# Patient Record
Sex: FEMALE | Race: BLACK OR AFRICAN AMERICAN | NOT HISPANIC OR LATINO | Employment: UNEMPLOYED | URBAN - METROPOLITAN AREA
[De-identification: names, ages, dates, MRNs, and addresses within clinical notes are randomized per-mention and may not be internally consistent; named-entity substitution may affect disease eponyms.]

---

## 2023-01-19 ENCOUNTER — HOSPITAL ENCOUNTER (OUTPATIENT)
Dept: DATA CONVERSION | Facility: HOSPITAL | Age: 23
End: 2023-01-19
Attending: OBSTETRICS & GYNECOLOGY

## 2023-01-19 DIAGNOSIS — O99.891 OTHER SPECIFIED DISEASES AND CONDITIONS COMPLICATING PREGNANCY (HHS-HCC): ICD-10-CM

## 2023-01-19 DIAGNOSIS — O47.1 FALSE LABOR AT OR AFTER 37 COMPLETED WEEKS OF GESTATION (HHS-HCC): ICD-10-CM

## 2023-01-19 DIAGNOSIS — Z3A.38 38 WEEKS GESTATION OF PREGNANCY (HHS-HCC): ICD-10-CM

## 2023-01-19 DIAGNOSIS — N89.8 OTHER SPECIFIED NONINFLAMMATORY DISORDERS OF VAGINA: ICD-10-CM

## 2023-01-19 DIAGNOSIS — O99.343 OTHER MENTAL DISORDERS COMPLICATING PREGNANCY, THIRD TRIMESTER (HHS-HCC): ICD-10-CM

## 2023-01-19 DIAGNOSIS — Z34.80 ENCOUNTER FOR SUPERVISION OF OTHER NORMAL PREGNANCY, UNSPECIFIED TRIMESTER (HHS-HCC): ICD-10-CM

## 2023-01-19 DIAGNOSIS — O99.820 STREPTOCOCCUS B CARRIER STATE COMPLICATING PREGNANCY (HHS-HCC): ICD-10-CM

## 2023-01-19 DIAGNOSIS — F32.A DEPRESSION, UNSPECIFIED: ICD-10-CM

## 2023-03-02 NOTE — PROGRESS NOTES
Current Stage:   Stage: Triage     OB Dating:   EDC/EGA:  ·  Final JOSSY 27-Jan-2023   ·  EGA 38.6     Subjective Data:   Antepartum:  Vaginal Bleeding: No   Contractions/Abdominal Pain: Yes   Discharge/Loss of Fluid: No   Fetal Movement: Good   Fevers/Chills: No   Preeclampsia Symptoms: No   Antepartum:    Sarah is a 21yo G1 at 38.6wks b/o LMP and c/w 14.6wk U/S who presents to triage for LOF.  She reports her membranes being stripped in the office today and since has  had continued leakage of blood tinged fluid.  She loss of fluid around 6pm and continued leakage since.  She reports contractions that have gotten closer together and are now about 5min apart.  She denies VB and endorses good fetal movement.    Of note, pt was in the office today and had a HA with increased swelling.  PEC labs were drawn.  Results reviewed with patient and were negative.  Pt asymptomatic now and with normotensive BPs.    Pregnancy notable for:  - Depression, follows w/ guidestone  - GBS+      Objective Information:    Objective Information:      T   P  R  BP   MAP  SpO2   Value  36.7  85  18  123/82   98  99%  Date/Time 1/19 20:30 1/19 20:40 1/19 20:30 1/19 20:30  1/19 20:30 1/19 20:40  Range  (36.7C - 36.7C )  (79 - 89 )  (18 - 18 )  (121 - 123 )/ (74 - 82 )  (98 - 98 )  (98% - 99% )      Pain reported at 1/19 20:30: 0 = None      Physical Exam:   Constitutional: Alert, conversational, well-appearing   Obstetric: Cervical Exam: 2/70/-2, consistent with  exam in office    FHT: 135, mod variability, +accels, -decels   [place]: palpated 2-5min apart, moderate    SSE:  - Neg pooling, nitrazine, ferning  - No active bleeding present  - No blood present in vault  - Normal vaginal discharge present   Eyes: Sclera white, EOM intact, no discharge or erythema   ENMT: No hearing deficit, no goiter present   Head/Neck: Normocephalic, atraumatic, full range  of motion intact   Respiratory/Thorax: No increased work of breathing,  no cough present,  rate normal, clear to auscultation   Cardiovascular: No edema present, RRR   Gastrointestinal: Gravid, abd soft and nontender  between ctx   Genitourinary: No suprapubic tenderness   Musculoskeletal: Strength +5 in all extremities bilaterally   Extremities: No calf tenderness, redness or warmth   Neurological: A/O x3, conversational   Breast: No redness or warmth   Psychological: Behavior appropriate, interactive   Skin: No rashes or lesions      Testing:   NST Interpretation - Baby A:  ·  Baseline    ·  Variability moderate (amplitude range 6 to 25 bpm)   ·  Interpretation Reactive (2 15x15 accels)   ·  Accelerations Present   ·  Decelerations Absent     Assessment and Plan:        Additional Dx:   38 weeks gestation of pregnancy: Entered Date: 2023  21:01   Non-stress test reactive: Entered Date: 2023 21:01   False labor: Entered Date: 2023 21:01    Assessment:    Sarah is a 23yo G1 at 38.6wks b/o LMP and c/w 14.6wk U/S who presents to triage for LOF and ctx    False labor   - Cervix: 2/70/-2, stable from office exam  - TOCO: ctx q2-5min, palpate as moderate  - SSE: neg x3  - S/sx of active labor reviewed  - Recommended tylenol, warm showers, hot packs for discomfort  - Via shared decision making, offered pt 2hr recheck vs returning when ctx worsen, pt opted to go home and return for signs of active labor    IUP at 38.6wks  - NST reactive  - Good fetal movement  - Continue routine prenatal care  - GBS+, for PCN in labor  - Precautions to return discussed     Maternal Well-being  - Vital signs stable and WNL  - All questions and concerns addressed    Plan and tracing discussed with Dr. Sharma who reviewed tracing and agrees with d/c home.     Leah Merino, MSN, APRN, FNP-C          Attestation:   Note Completion:  I am a:  Advanced Practice Provider   Attending Only - Shared Visit with Advanced Practice Provider This is a shared visit.  I have reviewed the Advanced Practice  Provider?s encounter note, approve the Advanced Practice Provider?s documentation,  and provide the following additional information from my personal encounter.    Comments/ Additional Findings    Pt seen and agree with above assessment and plan.  Pt slightly uncomfortable with contractions and offered a 2 hour re-check but patient declines.   Pt counseled on when to return and patient and partner expressed understanding. NST and vitals reviewed.           Electronic Signatures:  Linda Sharma)  (Signed 2023 00:32)   Authored: Note Completion   Co-Signer: Current Stage, OB Dating, Subjective Data, Objective Data,  Testing, Assessment and Plan, Note Completion  Leah Merino (APRN-CNP)  (Signed 2023 21:06)   Authored: Current Stage, OB Dating, Subjective Data,  Objective Data,  Testing, Assessment and Plan, Note Completion      Last Updated: 2023 00:32 by Linda Sharma)

## 2023-09-07 VITALS
HEART RATE: 89 BPM | WEIGHT: 138.89 LBS | TEMPERATURE: 98.1 F | SYSTOLIC BLOOD PRESSURE: 121 MMHG | BODY MASS INDEX: 28 KG/M2 | OXYGEN SATURATION: 98 % | DIASTOLIC BLOOD PRESSURE: 74 MMHG | RESPIRATION RATE: 18 BRPM | HEIGHT: 59 IN

## 2023-09-14 NOTE — PROGRESS NOTES
Current Stage:   Stage: Triage     OB Dating:   EDC/EGA:  ·  Final JOSSY 27-Jan-2023   ·  EGA 38.6     Subjective Data:   Antepartum:  Vaginal Bleeding: No   Contractions/Abdominal Pain: Yes   Discharge/Loss of Fluid: No   Fetal Movement: Good   Fevers/Chills: No   Preeclampsia Symptoms: No   Antepartum:    Sarah is a 21yo G1 at 38.6wks b/o LMP and c/w 14.6wk U/S who presents to triage for LOF.  She reports her membranes being stripped in the office today and since has  had continued leakage of blood tinged fluid.  She loss of fluid around 6pm and continued leakage since.  She reports contractions that have gotten closer together and are now about 5min apart.  She denies VB and endorses good fetal movement.    Of note, pt was in the office today and had a HA with increased swelling.  PEC labs were drawn.  Results reviewed with patient and were negative.  Pt asymptomatic now and with normotensive BPs.    Pregnancy notable for:  - Depression, follows w/ guidestone  - GBS+      Objective Information:    Objective Information:      T   P  R  BP   MAP  SpO2   Value  36.7  85  18  123/82   98  99%  Date/Time 1/19 20:30 1/19 20:40 1/19 20:30 1/19 20:30  1/19 20:30 1/19 20:40  Range  (36.7C - 36.7C )  (79 - 89 )  (18 - 18 )  (121 - 123 )/ (74 - 82 )  (98 - 98 )  (98% - 99% )      Pain reported at 1/19 20:30: 0 = None      Physical Exam:   Constitutional: Alert, conversational, well-appearing   Obstetric: Cervical Exam: 2/70/-2, consistent with  exam in office    FHT: 135, mod variability, +accels, -decels   [place]: palpated 2-5min apart, moderate    SSE:  - Neg pooling, nitrazine, ferning  - No active bleeding present  - No blood present in vault  - Normal vaginal discharge present   Eyes: Sclera white, EOM intact, no discharge or erythema   ENMT: No hearing deficit, no goiter present   Head/Neck: Normocephalic, atraumatic, full range  of motion intact   Respiratory/Thorax: No increased work of breathing,  no cough present,  rate normal, clear to auscultation   Cardiovascular: No edema present, RRR   Gastrointestinal: Gravid, abd soft and nontender  between ctx   Genitourinary: No suprapubic tenderness   Musculoskeletal: Strength +5 in all extremities bilaterally   Extremities: No calf tenderness, redness or warmth   Neurological: A/O x3, conversational   Breast: No redness or warmth   Psychological: Behavior appropriate, interactive   Skin: No rashes or lesions      Testing:   NST Interpretation - Baby A:  ·  Baseline    ·  Variability moderate (amplitude range 6 to 25 bpm)   ·  Interpretation Reactive (2 15x15 accels)   ·  Accelerations Present   ·  Decelerations Absent     Assessment and Plan:        Additional Dx:   38 weeks gestation of pregnancy: Entered Date: 2023  21:01   Non-stress test reactive: Entered Date: 2023 21:01   False labor: Entered Date: 2023 21:01    Assessment:    Sarah is a 23yo G1 at 38.6wks b/o LMP and c/w 14.6wk U/S who presents to triage for LOF and ctx    False labor   - Cervix: 2/70/-2, stable from office exam  - TOCO: ctx q2-5min, palpate as moderate  - SSE: neg x3  - S/sx of active labor reviewed  - Recommended tylenol, warm showers, hot packs for discomfort  - Via shared decision making, offered pt 2hr recheck vs returning when ctx worsen, pt opted to go home and return for signs of active labor    IUP at 38.6wks  - NST reactive  - Good fetal movement  - Continue routine prenatal care  - GBS+, for PCN in labor  - Precautions to return discussed     Maternal Well-being  - Vital signs stable and WNL  - All questions and concerns addressed    Plan and tracing discussed with Dr. Sharma who reviewed tracing and agrees with d/c home.     Leah Merino, MSN, APRN, FNP-C          Attestation:   Note Completion:  I am a:  Advanced Practice Provider   Attending Only - Shared Visit with Advanced Practice Provider This is a shared visit.  I have reviewed the Advanced Practice  Provider?s encounter note, approve the Advanced Practice Provider?s documentation,  and provide the following additional information from my personal encounter.    Comments/ Additional Findings    Pt seen and agree with above assessment and plan.  Pt slightly uncomfortable with contractions and offered a 2 hour re-check but patient declines.   Pt counseled on when to return and patient and partner expressed understanding. NST and vitals reviewed.           Electronic Signatures:  Linda Sharma)  (Signed 2023 00:32)   Authored: Note Completion   Co-Signer: Current Stage, OB Dating, Subjective Data, Objective Data,  Testing, Assessment and Plan, Note Completion  Leah Merino (APRN-CNP)  (Signed 2023 21:06)   Authored: Current Stage, OB Dating, Subjective Data,  Objective Data,  Testing, Assessment and Plan, Note Completion      Last Updated: 2023 00:32 by Linda Sharma)

## 2023-10-09 PROBLEM — R51.9 ACUTE NONINTRACTABLE HEADACHE: Status: ACTIVE | Noted: 2023-10-09

## 2023-10-09 PROBLEM — F32.A DEPRESSION AFFECTING PREGNANCY (HHS-HCC): Status: ACTIVE | Noted: 2023-10-09

## 2023-10-09 PROBLEM — O99.340 DEPRESSION AFFECTING PREGNANCY (HHS-HCC): Status: ACTIVE | Noted: 2023-10-09

## 2023-10-09 RX ORDER — ERGOCALCIFEROL (VITAMIN D2) 10 MCG
1 TABLET ORAL DAILY
COMMUNITY
Start: 2022-06-29

## 2023-10-09 RX ORDER — CLINDAMYCIN PHOSPHATE 10 UG/ML
LOTION TOPICAL
COMMUNITY
Start: 2020-10-23

## 2023-10-09 RX ORDER — FLUTICASONE PROPIONATE 50 MCG
SPRAY, SUSPENSION (ML) NASAL
COMMUNITY
Start: 2022-04-01

## 2023-10-10 ENCOUNTER — APPOINTMENT (OUTPATIENT)
Dept: DERMATOLOGY | Facility: CLINIC | Age: 23
End: 2023-10-10
Payer: COMMERCIAL

## 2023-11-27 ENCOUNTER — HOSPITAL ENCOUNTER (EMERGENCY)
Facility: HOSPITAL | Age: 23
Discharge: HOME | End: 2023-11-27
Payer: COMMERCIAL

## 2023-11-27 VITALS
SYSTOLIC BLOOD PRESSURE: 115 MMHG | RESPIRATION RATE: 18 BRPM | WEIGHT: 123 LBS | HEIGHT: 59 IN | HEART RATE: 76 BPM | OXYGEN SATURATION: 99 % | TEMPERATURE: 99 F | BODY MASS INDEX: 24.8 KG/M2 | DIASTOLIC BLOOD PRESSURE: 83 MMHG

## 2023-11-27 DIAGNOSIS — R11.0 NAUSEA: Primary | ICD-10-CM

## 2023-11-27 DIAGNOSIS — Z34.90 PREGNANCY, UNSPECIFIED GESTATIONAL AGE (HHS-HCC): ICD-10-CM

## 2023-11-27 LAB
APPEARANCE UR: ABNORMAL
BILIRUB UR STRIP.AUTO-MCNC: NEGATIVE MG/DL
COLOR UR: YELLOW
GLUCOSE UR STRIP.AUTO-MCNC: NEGATIVE MG/DL
HCG UR QL IA.RAPID: POSITIVE
KETONES UR STRIP.AUTO-MCNC: ABNORMAL MG/DL
LEUKOCYTE ESTERASE UR QL STRIP.AUTO: NEGATIVE
MUCOUS THREADS #/AREA URNS AUTO: NORMAL /LPF
NITRITE UR QL STRIP.AUTO: NEGATIVE
PH UR STRIP.AUTO: 6 [PH]
PREGNANCY TEST URINE, POC: POSITIVE
PROT UR STRIP.AUTO-MCNC: ABNORMAL MG/DL
RBC # UR STRIP.AUTO: NEGATIVE /UL
RBC #/AREA URNS AUTO: NORMAL /HPF
SP GR UR STRIP.AUTO: 1.03
SQUAMOUS #/AREA URNS AUTO: NORMAL /HPF
UROBILINOGEN UR STRIP.AUTO-MCNC: 2 MG/DL
WBC #/AREA URNS AUTO: NORMAL /HPF

## 2023-11-27 PROCEDURE — 81025 URINE PREGNANCY TEST: CPT | Performed by: EMERGENCY MEDICINE

## 2023-11-27 PROCEDURE — 99284 EMERGENCY DEPT VISIT MOD MDM: CPT

## 2023-11-27 PROCEDURE — 81001 URINALYSIS AUTO W/SCOPE: CPT | Performed by: PHYSICIAN ASSISTANT

## 2023-11-27 PROCEDURE — 99283 EMERGENCY DEPT VISIT LOW MDM: CPT

## 2023-11-27 ASSESSMENT — COLUMBIA-SUICIDE SEVERITY RATING SCALE - C-SSRS
6. HAVE YOU EVER DONE ANYTHING, STARTED TO DO ANYTHING, OR PREPARED TO DO ANYTHING TO END YOUR LIFE?: NO
1. IN THE PAST MONTH, HAVE YOU WISHED YOU WERE DEAD OR WISHED YOU COULD GO TO SLEEP AND NOT WAKE UP?: NO
2. HAVE YOU ACTUALLY HAD ANY THOUGHTS OF KILLING YOURSELF?: NO

## 2023-11-27 NOTE — ED PROVIDER NOTES
"Limitations to History: None  External Records Reviewed  Independent Historians: None  Social determinants affecting care: None    HPI  Sarah Reynolds is a 23 y.o. female who presents emergency department for assessment of nausea.  She reports that for the last few days she has been having nausea and does not really want to eat because this makes her nausea worse.  She reports that she is moving her bowels normally and urinating appropriately.  She is about 12 days late on her menstrual cycle however this can be normal for her.  She has not had abdominal pains or actual vomiting.  She denies any chest pains or shortness of breath.  She has not had any cough, nasal congestion, rhinorrhea, or sore throat.  Has not had fever or chills.  She has not been around sick contacts.  She has no further complaints.    PMH  No past medical history on file. reviewed by myself.    Meds  Current Outpatient Medications   Medication Instructions    clindamycin (Cleocin T) 1 % lotion Clindamycin Phosphate 1 % External Lotion   Quantity: 60  Refills: 0        Start : 23-Oct-2020   Active    fluticasone (Flonase) 50 mcg/actuation nasal spray Fluticasone Propionate 50 MCG/ACT Nasal Suspension   Quantity: 16  Refills: 0        Start : 1-Apr-2022   Active    prenatal vit no.129-iron-folic (Prenatal One Daily) 27 mg iron- 800 mcg tablet 1 tablet, oral, Daily    prenatal vitamin, iron-folic, (prenatal vit no.130-iron-folic) 27 mg iron-800 mcg folic acid tablet 1 tablet, oral, Daily       Allergies  No Known Allergies reviewed by myself.    SHx    reviewed by myself.      ------------------------------------------------------------------------------------------------------------------------------------------    /83   Pulse 76   Temp 37.2 °C (99 °F) (Temporal)   Resp 18   Ht 1.499 m (4' 11\")   Wt 55.8 kg (123 lb)   LMP 10/15/2023 (Approximate)   SpO2 99%   BMI 24.84 kg/m²     Physical Exam  Vitals and nursing note reviewed. "   Constitutional:       General: She is not in acute distress.     Appearance: Normal appearance. She is normal weight. She is not ill-appearing or toxic-appearing.   HENT:      Head: Normocephalic.      Nose: Nose normal.      Mouth/Throat:      Mouth: Mucous membranes are moist.      Pharynx: Oropharynx is clear.   Eyes:      Extraocular Movements: Extraocular movements intact.      Conjunctiva/sclera: Conjunctivae normal.   Cardiovascular:      Rate and Rhythm: Normal rate and regular rhythm.   Pulmonary:      Effort: Pulmonary effort is normal.      Breath sounds: Normal breath sounds.   Abdominal:      General: Abdomen is flat. Bowel sounds are normal.      Palpations: Abdomen is soft.      Tenderness: There is no abdominal tenderness. There is no guarding or rebound.   Musculoskeletal:         General: Normal range of motion.      Cervical back: Neck supple.   Skin:     General: Skin is warm and dry.   Neurological:      General: No focal deficit present.      Mental Status: She is alert and oriented to person, place, and time.   Psychiatric:         Attention and Perception: Attention normal.         Mood and Affect: Mood normal.          ------------------------------------------------------------------------------------------------------------------------------------------  Imaging  No orders to display        Labs  Labs Reviewed   HCG, URINE, QUALITATIVE - Abnormal       Result Value    HCG, Urine POSITIVE (*)    URINALYSIS WITH REFLEX MICROSCOPIC AND CULTURE - Abnormal    Color, Urine Yellow      Appearance, Urine Hazy (*)     Specific Gravity, Urine 1.028      pH, Urine 6.0      Protein, Urine 30 (1+) (*)     Glucose, Urine NEGATIVE      Blood, Urine NEGATIVE      Ketones, Urine 20 (1+) (*)     Bilirubin, Urine NEGATIVE      Urobilinogen, Urine 2.0 (*)     Nitrite, Urine NEGATIVE      Leukocyte Esterase, Urine NEGATIVE     POCT PREGNANCY, URINE - Abnormal    Preg Test, Ur Positive (*)    URINALYSIS WITH  REFLEX MICROSCOPIC AND CULTURE    Narrative:     The following orders were created for panel order Urinalysis with Reflex Microscopic and Culture.  Procedure                               Abnormality         Status                     ---------                               -----------         ------                     Urinalysis with Reflex M...[[id number]]  Abnormal            Final result               Extra Urine Gray Tube[[id number]]                            In process                   Please view results for these tests on the individual orders.   EXTRA URINE GRAY TUBE   URINALYSIS MICROSCOPIC WITH REFLEX CULTURE    WBC, Urine NONE      RBC, Urine 1-2      Squamous Epithelial Cells, Urine 1-9 (SPARSE)      Mucus, Urine 1+          ED Course  Diagnoses as of 11/27/23 1644   Nausea   Pregnancy, unspecified gestational age        Medical Decision Making: She did not appear ill or toxic.  Vital signs reviewed and stable.  Her abdomen is soft and nontender.  Urinalysis obtained as well as a urine pregnancy.     Differential diagnoses considered: Pregnancy, UTI, COVID, influenza, viral illness, others    Urine pregnancy came back positive.  UA negative for UTI.  I discussed this with the patient.  I recommended she only takes Tylenol for pain control.  She does have an OB/GYN and she was instructed to call tomorrow for close follow-up appointment.  She not had any vaginal bleeding and does not have any abdominal tenderness or pain so I do not believe she needs any further imaging at this time.  She was recommended to start taking a prenatal vitamin.  She is to return to the ER immediately if symptoms change or worsen.  She verbalized understanding and agreed to plan of care.  She was discharged home in stable condition.       Diagnosis:  Nausea, pregnancy  Plan: Discharge           John Quinones PA-C  11/27/23 [id number]

## 2023-11-27 NOTE — DISCHARGE INSTRUCTIONS
Follow with your OB/GYN within 1 week due to the pregnancy.  If you have pain, you can take Tylenol.  Start taking prenatal vitamin.  Return to the ER immediately if your symptoms change or worsen.

## 2023-12-13 LAB — HOLD SPECIMEN: NORMAL

## 2024-01-30 ENCOUNTER — APPOINTMENT (OUTPATIENT)
Dept: OBSTETRICS AND GYNECOLOGY | Facility: CLINIC | Age: 24
End: 2024-01-30
Payer: COMMERCIAL

## 2024-06-17 ENCOUNTER — LAB (OUTPATIENT)
Dept: LAB | Facility: LAB | Age: 24
End: 2024-06-17
Payer: COMMERCIAL

## 2024-06-17 ENCOUNTER — APPOINTMENT (OUTPATIENT)
Dept: OBSTETRICS AND GYNECOLOGY | Facility: CLINIC | Age: 24
End: 2024-06-17
Payer: COMMERCIAL

## 2024-06-17 DIAGNOSIS — Z32.00 ENCOUNTER FOR CONFIRMATION OF PREGNANCY TEST RESULT WITH PHYSICAL EXAMINATION: Primary | ICD-10-CM

## 2024-06-17 DIAGNOSIS — Z32.00 ENCOUNTER FOR CONFIRMATION OF PREGNANCY TEST RESULT WITH PHYSICAL EXAMINATION: ICD-10-CM

## 2024-06-17 LAB — B-HCG SERPL-ACNC: ABNORMAL MIU/ML

## 2024-06-17 PROCEDURE — 1036F TOBACCO NON-USER: CPT | Performed by: NURSE PRACTITIONER

## 2024-06-17 PROCEDURE — 99213 OFFICE O/P EST LOW 20 MIN: CPT | Performed by: NURSE PRACTITIONER

## 2024-06-17 PROCEDURE — 36415 COLL VENOUS BLD VENIPUNCTURE: CPT

## 2024-06-17 PROCEDURE — 84702 CHORIONIC GONADOTROPIN TEST: CPT

## 2024-06-17 RX ORDER — TRETINOIN 0.25 MG/G
CREAM TOPICAL
COMMUNITY
Start: 2023-07-10

## 2024-06-17 ASSESSMENT — ENCOUNTER SYMPTOMS
COUGH: 1
LOSS OF SENSATION IN FEET: 0
OCCASIONAL FEELINGS OF UNSTEADINESS: 0
DEPRESSION: 0

## 2024-06-17 NOTE — PROGRESS NOTES
Subjective   Patient ID: Sarah Reynolds is a 23 y.o. female who presents for miscarriage follow up (Pt here d/t reported miscarriage in May/Positive pregnancy test in office/Seen in ED 5/5/24).  HPI  23-year-old G3, P1 here today as a new patient.  Patient was scheduled as a new OB visit but is here stating she had a miscarriage.    Review of history shows patient has had 1 full-term positive delivery January 20, 2022 that was an uncomplicated delivery.  Patient terminated a pregnancy in January of this year and was started on birth control pills.  Her last menstrual period was approximately April 3 and then she went to the emergency room on May 5 reporting bleeding in early pregnancy and had a beta quant of 12.  She declined an ultrasound at that time and stated she was going to follow-up with her obstetrician the following day which she never did.    She reports that she bled heavily for 1 week with blood clots and has not bled since that time.    She is here today with this previously scheduled appointment.  In office urine pregnancy test is positive.  Review of Systems   Respiratory:  Positive for cough.    Genitourinary:  Positive for vaginal bleeding.   All other systems reviewed and are negative.      Objective   Physical Exam deferred  Assessment/Plan   Problem List Items Addressed This Visit    None  Visit Diagnoses         Codes    Encounter for confirmation of pregnancy test result with physical examination    -  Primary Z32.00    Relevant Orders    Human Chorionic Gonadotropin, Serum Quantitative          Beta quant ordered and we will proceed after results are back.  Patient will need to have plan for continuing pregnancy or alternative once results are in.       Tina Valera, TAN-CNP 06/17/24 4:12 PM

## 2024-06-20 ENCOUNTER — HOSPITAL ENCOUNTER (OUTPATIENT)
Dept: RADIOLOGY | Facility: CLINIC | Age: 24
Discharge: HOME | End: 2024-06-20
Payer: COMMERCIAL

## 2024-06-20 DIAGNOSIS — Z32.00 ENCOUNTER FOR CONFIRMATION OF PREGNANCY TEST RESULT WITH PHYSICAL EXAMINATION: ICD-10-CM

## 2024-06-20 PROCEDURE — 76817 TRANSVAGINAL US OBSTETRIC: CPT

## 2024-06-20 PROCEDURE — 76801 OB US < 14 WKS SINGLE FETUS: CPT

## 2024-07-11 ENCOUNTER — ROUTINE PRENATAL (OUTPATIENT)
Dept: OBSTETRICS AND GYNECOLOGY | Facility: CLINIC | Age: 24
End: 2024-07-11
Payer: COMMERCIAL

## 2024-07-11 VITALS — BODY MASS INDEX: 23.23 KG/M2 | WEIGHT: 115 LBS | DIASTOLIC BLOOD PRESSURE: 70 MMHG | SYSTOLIC BLOOD PRESSURE: 103 MMHG

## 2024-07-11 DIAGNOSIS — Z3A.09 9 WEEKS GESTATION OF PREGNANCY (HHS-HCC): Primary | ICD-10-CM

## 2024-07-11 PROCEDURE — 87591 N.GONORRHOEAE DNA AMP PROB: CPT

## 2024-07-11 PROCEDURE — 87661 TRICHOMONAS VAGINALIS AMPLIF: CPT

## 2024-07-11 PROCEDURE — 99214 OFFICE O/P EST MOD 30 MIN: CPT | Performed by: NURSE PRACTITIONER

## 2024-07-11 PROCEDURE — 87491 CHLMYD TRACH DNA AMP PROBE: CPT

## 2024-07-11 PROCEDURE — 88175 CYTOPATH C/V AUTO FLUID REDO: CPT

## 2024-07-11 ASSESSMENT — ENCOUNTER SYMPTOMS
RESPIRATORY NEGATIVE: 0
ENDOCRINE NEGATIVE: 0
CARDIOVASCULAR NEGATIVE: 0
PSYCHIATRIC NEGATIVE: 0
CONSTITUTIONAL NEGATIVE: 0
GASTROINTESTINAL NEGATIVE: 0
ALLERGIC/IMMUNOLOGIC NEGATIVE: 0
MUSCULOSKELETAL NEGATIVE: 0
HEMATOLOGIC/LYMPHATIC NEGATIVE: 0
EYES NEGATIVE: 0
NEUROLOGICAL NEGATIVE: 0

## 2024-07-11 NOTE — PROGRESS NOTES
Subjective   Patient ID [id number]   Sarah Reynolds is a 23 y.o.  at 9w2d with a working estimated date of delivery of 2025, by Ultrasound who presents for an initial prenatal visit. This pregnancy is  accepted .    Her pregnancy is complicated by:  Recent termination of pregnancy    OB History    Para Term  AB Living   3 1 1   1 1   SAB IAB Ectopic Multiple Live Births     1     1      # Outcome Date GA Lbr Oswaldo/2nd Weight Sex Type Anes PTL Lv   3 Current            2 IAB 24           1 Term 23 39w0d  2.778 kg M Vag-Spont EPI N LUDIN          Objective   Physical Exam  Weight: 52.2 kg (115 lb)  Expected Total Weight Gain: Could not be calculated   Pregravid BMI: Could not be calculated  BP: 103/70          OBGyn Exam    Prenatal Labs  Labs were ordered    Assessment/Plan   Problem List Items Addressed This Visit    None  Visit Diagnoses         Codes    9 weeks gestation of pregnancy ([facility])    -  Primary Z3A.09    Relevant Orders    CBC Anemia Panel With Reflex,Pregnancy    Type And Screen    Hemoglobin Identification with Path Review    Syphilis Screen with Reflex    Rubella Antibody, IgG    Hepatitis C Antibody    Urine Culture    Hepatitis B Surface Antigen    HIV 1/2 Antigen/Antibody Screen with Reflex to Confirmation    TSH with reflex to Free T4 if abnormal    THINPREP PAP TEST    Hemoglobin A1C    Myriad Prequel Prenatal Screen    US MAC OB imaging order            Immunizations: Not reviewed  Prenatal Labs ordered  Daily prenatal vitamins prescribed  First trimester screening and second trimester screening discussed. Patient decided to prequel screening will be drawn once patient reaches 10 weeks.  This will include gender screening although patient has had this done at an outside laboratory already.  Follow up in 4 weeks for return OB visit.  Subjective   Patient ID [id number]   Sarah Reynolds is a 23 y.o.  at 9w2d with a working estimated date of  "delivery of 2/11/2025, by Ultrasound who presents for a routine prenatal visit. She denies vaginal bleeding, leakage of fluid, decreased fetal movements, and contractions.    Her pregnancy is complicated by:  Nothing    Objective   Physical Exam  Weight: 52.2 kg (115 lb), Pregravid BMI: Could not be calculated  Expected Total Weight Gain: Could not be calculated   BP: 103/70    Fundal Height (cm): 8 cm    Prenatal Labs  Urine dip:  Lab Results   Component Value Date    KETONESU NEGATIVE 05/05/2024     Lab Results   Component Value Date    HGB 12.5 05/05/2024    HCT 36.6 05/05/2024    ABO O 05/05/2024    HEPBSAG NONREACTIVE 06/29/2022     No results found for: \"PAPPA\", \"AFP\", \"HCG\", \"ESTRIOL\", \"INHBA\"    Imaging  The most recent ultrasound was performed on The most recent ultrasound study is not finalized with a study GA of The most recent ultrasound study is not finalized.  The most recent ultrasound study is not finalized  The most recent ultrasound study is not finalized    Assessment/Plan   Problem List Items Addressed This Visit    None  Visit Diagnoses         Codes    9 weeks gestation of pregnancy ([facility])    -  Primary Z3A.09    Relevant Orders    CBC Anemia Panel With Reflex,Pregnancy    Type And Screen    Hemoglobin Identification with Path Review    Syphilis Screen with Reflex    Rubella Antibody, IgG    Hepatitis C Antibody    Urine Culture    Hepatitis B Surface Antigen    HIV 1/2 Antigen/Antibody Screen with Reflex to Confirmation    TSH with reflex to Free T4 if abnormal    THINPREP PAP TEST    Hemoglobin A1C    Myriad Prequel Prenatal Screen    US MAC OB imaging order            Continue prenatal vitamin.  Labs reviewed.  Order placed for anatomy scan at 20 weeks.  Nuchal translucency scan 12 to 13 weeks  Follow up in 4 weeks for a routine prenatal visit.  "

## 2024-07-13 LAB
C TRACH RRNA SPEC QL NAA+PROBE: NEGATIVE
N GONORRHOEA DNA SPEC QL PROBE+SIG AMP: NEGATIVE
T VAGINALIS RRNA SPEC QL NAA+PROBE: NEGATIVE

## 2024-07-16 LAB
CYTOLOGY CMNT CVX/VAG CYTO-IMP: NORMAL
LAB AP HPV HR: NORMAL
LAB AP PAP ADDITIONAL TESTS: NORMAL
LABORATORY COMMENT REPORT: NORMAL
MENSTRUAL HX REPORTED: NORMAL
PATH REPORT.TOTAL CANCER: NORMAL

## 2024-07-18 ENCOUNTER — LAB (OUTPATIENT)
Dept: LAB | Facility: LAB | Age: 24
End: 2024-07-18
Payer: COMMERCIAL

## 2024-07-18 DIAGNOSIS — Z3A.09 9 WEEKS GESTATION OF PREGNANCY (HHS-HCC): ICD-10-CM

## 2024-07-18 LAB
ABO GROUP (TYPE) IN BLOOD: NORMAL
ANTIBODY SCREEN: NORMAL
ERYTHROCYTE [DISTWIDTH] IN BLOOD BY AUTOMATED COUNT: 12 % (ref 11.5–14.5)
EST. AVERAGE GLUCOSE BLD GHB EST-MCNC: 74 MG/DL
HBA1C MFR BLD: 4.2 %
HBV SURFACE AG SERPL QL IA: NONREACTIVE
HCT VFR BLD AUTO: 36.8 % (ref 36–46)
HCV AB SER QL: NONREACTIVE
HGB BLD-MCNC: 12.3 G/DL (ref 12–16)
HIV 1+2 AB+HIV1 P24 AG SERPL QL IA: NONREACTIVE
MCH RBC QN AUTO: 31.5 PG (ref 26–34)
MCHC RBC AUTO-ENTMCNC: 33.4 G/DL (ref 32–36)
MCV RBC AUTO: 94 FL (ref 80–100)
NRBC BLD-RTO: 0 /100 WBCS (ref 0–0)
PLATELET # BLD AUTO: 238 X10*3/UL (ref 150–450)
RBC # BLD AUTO: 3.91 X10*6/UL (ref 4–5.2)
REFLEX ADDED, ANEMIA PANEL: NORMAL
RH FACTOR (ANTIGEN D): NORMAL
RUBV IGG SERPL IA-ACNC: 4.7 IA
RUBV IGG SERPL QL IA: POSITIVE
TREPONEMA PALLIDUM IGG+IGM AB [PRESENCE] IN SERUM OR PLASMA BY IMMUNOASSAY: NONREACTIVE
TSH SERPL-ACNC: 1.1 MIU/L (ref 0.44–3.98)
WBC # BLD AUTO: 10.8 X10*3/UL (ref 4.4–11.3)

## 2024-07-18 PROCEDURE — 87389 HIV-1 AG W/HIV-1&-2 AB AG IA: CPT

## 2024-07-18 PROCEDURE — 86901 BLOOD TYPING SEROLOGIC RH(D): CPT

## 2024-07-18 PROCEDURE — 86850 RBC ANTIBODY SCREEN: CPT

## 2024-07-18 PROCEDURE — 83021 HEMOGLOBIN CHROMOTOGRAPHY: CPT

## 2024-07-18 PROCEDURE — 36415 COLL VENOUS BLD VENIPUNCTURE: CPT

## 2024-07-18 PROCEDURE — 83036 HEMOGLOBIN GLYCOSYLATED A1C: CPT

## 2024-07-18 PROCEDURE — 86780 TREPONEMA PALLIDUM: CPT

## 2024-07-18 PROCEDURE — 86900 BLOOD TYPING SEROLOGIC ABO: CPT

## 2024-07-18 PROCEDURE — 87086 URINE CULTURE/COLONY COUNT: CPT

## 2024-07-18 PROCEDURE — 84443 ASSAY THYROID STIM HORMONE: CPT

## 2024-07-18 PROCEDURE — 86317 IMMUNOASSAY INFECTIOUS AGENT: CPT

## 2024-07-18 PROCEDURE — 87340 HEPATITIS B SURFACE AG IA: CPT

## 2024-07-18 PROCEDURE — 86803 HEPATITIS C AB TEST: CPT

## 2024-07-18 PROCEDURE — 85027 COMPLETE CBC AUTOMATED: CPT

## 2024-07-19 LAB
BACTERIA UR CULT: NORMAL
HEMOGLOBIN A2: 2.7 % (ref 2–3.5)
HEMOGLOBIN A: 97.1 % (ref 95.8–98)
HEMOGLOBIN F: 0.2 % (ref 0–2)
HEMOGLOBIN IDENTIFICATION INTERPRETATION: NORMAL
PATH REVIEW-HGB IDENTIFICATION: NORMAL

## 2024-07-26 LAB — SCAN RESULT: NORMAL

## 2024-08-08 ENCOUNTER — HOSPITAL ENCOUNTER (OUTPATIENT)
Dept: RADIOLOGY | Facility: CLINIC | Age: 24
Discharge: HOME | End: 2024-08-08
Payer: COMMERCIAL

## 2024-08-08 DIAGNOSIS — Z3A.09 9 WEEKS GESTATION OF PREGNANCY (HHS-HCC): ICD-10-CM

## 2024-08-08 PROCEDURE — 76816 OB US FOLLOW-UP PER FETUS: CPT

## 2024-08-12 ENCOUNTER — APPOINTMENT (OUTPATIENT)
Dept: OBSTETRICS AND GYNECOLOGY | Facility: CLINIC | Age: 24
End: 2024-08-12
Payer: COMMERCIAL

## 2024-08-12 VITALS — WEIGHT: 116 LBS | BODY MASS INDEX: 23.43 KG/M2 | SYSTOLIC BLOOD PRESSURE: 103 MMHG | DIASTOLIC BLOOD PRESSURE: 69 MMHG

## 2024-08-12 DIAGNOSIS — Z3A.13 13 WEEKS GESTATION OF PREGNANCY (HHS-HCC): Primary | ICD-10-CM

## 2024-08-12 PROCEDURE — 99213 OFFICE O/P EST LOW 20 MIN: CPT | Performed by: NURSE PRACTITIONER

## 2024-08-12 NOTE — PROGRESS NOTES
Subjective   Sarah Reynolds is a 23 y.o.  at 13w6d with a working estimated date of delivery of 2025, by Ultrasound who presents for a routine prenatal visit.     She denies vaginal bleeding, abdominal pain, leakage of fluid.     Objective   Physical Exam  Weight: 52.6 kg (116 lb)  TWG: -4.082 kg (-9 lb)   BP: 103/69  Fetal Heart Rate: 133 Fundal Height (cm): 13 cm    Prenatal Labs  Lab Results   Component Value Date    HGB 12.3 2024    HCT 36.8 2024     2024    ABO O 2024    LABRH POS 2024    NEISSGONOAMP Negative 2024    CHLAMTRACAMP Negative 2024    SYPHT Nonreactive 2024    HEPBSAG Nonreactive 2024    HIV1X2 Nonreactive 2024    URINECULTURE No significant growth 2024     NIPT: Normal results    Imaging  The most recent ultrasound was performed on  with a study GA of 13 weeks 3 days.  Patient has not yet scheduled her anatomy ultrasound and is reminded to do that today.    Assessment/Plan   Problem List Items Addressed This Visit    None  Visit Diagnoses       13 weeks gestation of pregnancy ([facility])    -  Primary            Reviewed warning signs and when to call provider  Follow up in 4 weeks for a routine prenatal visit.    TAN Ayon-CNP

## 2024-09-12 ENCOUNTER — APPOINTMENT (OUTPATIENT)
Dept: OBSTETRICS AND GYNECOLOGY | Facility: CLINIC | Age: 24
End: 2024-09-12
Payer: COMMERCIAL

## 2024-09-12 VITALS — DIASTOLIC BLOOD PRESSURE: 74 MMHG | SYSTOLIC BLOOD PRESSURE: 112 MMHG | BODY MASS INDEX: 24.64 KG/M2 | WEIGHT: 122 LBS

## 2024-09-12 DIAGNOSIS — Z3A.18 18 WEEKS GESTATION OF PREGNANCY (HHS-HCC): ICD-10-CM

## 2024-09-12 DIAGNOSIS — Z34.82 PRENATAL CARE, SUBSEQUENT PREGNANCY, SECOND TRIMESTER (HHS-HCC): Primary | ICD-10-CM

## 2024-09-12 PROCEDURE — 99213 OFFICE O/P EST LOW 20 MIN: CPT | Performed by: NURSE PRACTITIONER

## 2024-09-12 ASSESSMENT — ENCOUNTER SYMPTOMS
OCCASIONAL FEELINGS OF UNSTEADINESS: 0
DEPRESSION: 0
LOSS OF SENSATION IN FEET: 0

## 2024-09-12 NOTE — PROGRESS NOTES
Subjective     Sarah Reynolds is a 23 y.o.  at 18w2d with a working estimated date of delivery of 2025, by Ultrasound who presents for a routine prenatal visit.   Occasional sharp headache that seems to be related to neck and muscle pain.  She denies vaginal bleeding, leakage of fluid, decreased fetal movements, or contractions.    Objective   Physical Exam  Weight: 55.3 kg (122 lb)  TWG: -1.361 kg (-3 lb)  BP: 112/74  Fetal Heart Rate: 153 Fundal Height (cm): 18 cm  Postpartum Depression: Not on file       Imaging  The most recent ultrasound was performed on  with anatomy scan scheduled for .      Assessment/Plan   Problem List Items Addressed This Visit    None      Reviewed s/sx of PTL, warning signs, fetal movement counts, and when to call provider  Follow up in 4 weeks for a routine prenatal visit.    Tina Valera, APRN-CNP

## 2024-09-15 ENCOUNTER — HOSPITAL ENCOUNTER (OUTPATIENT)
Facility: HOSPITAL | Age: 24
End: 2024-09-15
Attending: OBSTETRICS & GYNECOLOGY | Admitting: OBSTETRICS & GYNECOLOGY
Payer: COMMERCIAL

## 2024-09-15 ENCOUNTER — HOSPITAL ENCOUNTER (OUTPATIENT)
Facility: HOSPITAL | Age: 24
Discharge: HOME | End: 2024-09-15
Attending: OBSTETRICS & GYNECOLOGY | Admitting: OBSTETRICS & GYNECOLOGY
Payer: COMMERCIAL

## 2024-09-15 VITALS
TEMPERATURE: 97.2 F | BODY MASS INDEX: 24.67 KG/M2 | RESPIRATION RATE: 18 BRPM | OXYGEN SATURATION: 100 % | SYSTOLIC BLOOD PRESSURE: 115 MMHG | HEART RATE: 81 BPM | WEIGHT: 122.36 LBS | HEIGHT: 59 IN | DIASTOLIC BLOOD PRESSURE: 56 MMHG

## 2024-09-15 LAB
APPEARANCE UR: ABNORMAL
BILIRUB UR STRIP.AUTO-MCNC: NEGATIVE MG/DL
COLOR UR: YELLOW
GLUCOSE UR STRIP.AUTO-MCNC: NORMAL MG/DL
KETONES UR STRIP.AUTO-MCNC: NEGATIVE MG/DL
LEUKOCYTE ESTERASE UR QL STRIP.AUTO: ABNORMAL
MUCOUS THREADS #/AREA URNS AUTO: ABNORMAL /LPF
NITRITE UR QL STRIP.AUTO: NEGATIVE
PH UR STRIP.AUTO: 6 [PH]
PROT UR STRIP.AUTO-MCNC: ABNORMAL MG/DL
RBC # UR STRIP.AUTO: NEGATIVE /UL
RBC #/AREA URNS AUTO: ABNORMAL /HPF
SP GR UR STRIP.AUTO: 1.03
SQUAMOUS #/AREA URNS AUTO: ABNORMAL /HPF
UROBILINOGEN UR STRIP.AUTO-MCNC: NORMAL MG/DL
WBC #/AREA URNS AUTO: ABNORMAL /HPF

## 2024-09-15 PROCEDURE — 87086 URINE CULTURE/COLONY COUNT: CPT | Mod: AHULAB | Performed by: MIDWIFE

## 2024-09-15 PROCEDURE — 99214 OFFICE O/P EST MOD 30 MIN: CPT

## 2024-09-15 PROCEDURE — 99213 OFFICE O/P EST LOW 20 MIN: CPT | Performed by: MIDWIFE

## 2024-09-15 PROCEDURE — 81001 URINALYSIS AUTO W/SCOPE: CPT | Performed by: MIDWIFE

## 2024-09-15 SDOH — SOCIAL STABILITY: SOCIAL INSECURITY: ARE YOU OR HAVE YOU BEEN THREATENED OR ABUSED PHYSICALLY, EMOTIONALLY, OR SEXUALLY BY ANYONE?: NO

## 2024-09-15 SDOH — HEALTH STABILITY: MENTAL HEALTH: SUICIDAL BEHAVIOR (LIFETIME): NO

## 2024-09-15 SDOH — HEALTH STABILITY: MENTAL HEALTH: NON-SPECIFIC ACTIVE SUICIDAL THOUGHTS (PAST 1 MONTH): NO

## 2024-09-15 SDOH — SOCIAL STABILITY: SOCIAL INSECURITY: DOES ANYONE TRY TO KEEP YOU FROM HAVING/CONTACTING OTHER FRIENDS OR DOING THINGS OUTSIDE YOUR HOME?: NO

## 2024-09-15 SDOH — SOCIAL STABILITY: SOCIAL INSECURITY: HAS ANYONE EVER THREATENED TO HURT YOUR FAMILY OR YOUR PETS?: NO

## 2024-09-15 SDOH — SOCIAL STABILITY: SOCIAL INSECURITY: DO YOU FEEL ANYONE HAS EXPLOITED OR TAKEN ADVANTAGE OF YOU FINANCIALLY OR OF YOUR PERSONAL PROPERTY?: NO

## 2024-09-15 SDOH — SOCIAL STABILITY: SOCIAL INSECURITY: VERBAL ABUSE: DENIES

## 2024-09-15 SDOH — HEALTH STABILITY: MENTAL HEALTH: WISH TO BE DEAD (PAST 1 MONTH): NO

## 2024-09-15 SDOH — SOCIAL STABILITY: SOCIAL INSECURITY: HAVE YOU HAD ANY THOUGHTS OF HARMING ANYONE ELSE?: NO

## 2024-09-15 SDOH — SOCIAL STABILITY: SOCIAL INSECURITY: HAVE YOU HAD THOUGHTS OF HARMING ANYONE ELSE?: NO

## 2024-09-15 SDOH — ECONOMIC STABILITY: HOUSING INSECURITY: DO YOU FEEL UNSAFE GOING BACK TO THE PLACE WHERE YOU ARE LIVING?: NO

## 2024-09-15 SDOH — SOCIAL STABILITY: SOCIAL INSECURITY: PHYSICAL ABUSE: DENIES

## 2024-09-15 SDOH — SOCIAL STABILITY: SOCIAL INSECURITY: ABUSE SCREEN: ADULT

## 2024-09-15 SDOH — HEALTH STABILITY: MENTAL HEALTH: WERE YOU ABLE TO COMPLETE ALL THE BEHAVIORAL HEALTH SCREENINGS?: YES

## 2024-09-15 SDOH — SOCIAL STABILITY: SOCIAL INSECURITY: ARE THERE ANY APPARENT SIGNS OF INJURIES/BEHAVIORS THAT COULD BE RELATED TO ABUSE/NEGLECT?: NO

## 2024-09-15 ASSESSMENT — PATIENT HEALTH QUESTIONNAIRE - PHQ9
2. FEELING DOWN, DEPRESSED OR HOPELESS: NOT AT ALL
SUM OF ALL RESPONSES TO PHQ9 QUESTIONS 1 & 2: 0
1. LITTLE INTEREST OR PLEASURE IN DOING THINGS: NOT AT ALL

## 2024-09-15 ASSESSMENT — LIFESTYLE VARIABLES
AUDIT-C TOTAL SCORE: 0
HOW OFTEN DO YOU HAVE 6 OR MORE DRINKS ON ONE OCCASION: NEVER
SKIP TO QUESTIONS 9-10: 1
HOW MANY STANDARD DRINKS CONTAINING ALCOHOL DO YOU HAVE ON A TYPICAL DAY: PATIENT DOES NOT DRINK
HOW OFTEN DO YOU HAVE A DRINK CONTAINING ALCOHOL: NEVER
AUDIT-C TOTAL SCORE: 0

## 2024-09-15 ASSESSMENT — PAIN SCALES - GENERAL: PAINLEVEL_OUTOF10: 2

## 2024-09-15 NOTE — DISCHARGE SUMMARY
Discharge Summary    Admission Date: 9/15/2024  Discharge Date: 9/15/2024    Discharge Diagnosis  Abdominal pain affecting pregnancy     Hospital Course  See H&P       Pertinent Physical Exam At Time of Discharge  Unchanged from arrival     Last Vitals:  Temp Pulse Resp BP MAP Pulse Ox   36.3 °C (97.3 °F) 84 18 110/56 80       Discharge Meds     Your medication list        CONTINUE taking these medications        Instructions Last Dose Given Next Dose Due   clindamycin 1 % lotion  Commonly known as: Cleocin T           fluticasone 50 mcg/actuation nasal spray  Commonly known as: Flonase           Prenatal One Daily 27 mg iron- 800 mcg tablet  Generic drug: prenatal vit no.129-iron-folic                     Complications Requiring Follow-Up  Keep scheduled US and follow up  RTO for worsening/new/unrelieved symptoms     Test Results Pending At Discharge  Pending Labs       No current pending labs.            Outpatient Follow-Up  Future Appointments   Date Time Provider Department Center   9/17/2024  1:30 PM [facility] [id number] OBGYNIMG ULTRASOUND 3 [id number] [facility]   10/10/2024 10:45 AM TAN Ayon-CNP [id number] [facility]       I spent 10 minutes in the professional and overall care of this patient.      TAN Livingston-ZECHARIAH
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
## 2024-09-16 LAB — HOLD SPECIMEN: NORMAL

## 2024-09-17 ENCOUNTER — HOSPITAL ENCOUNTER (OUTPATIENT)
Dept: RADIOLOGY | Facility: CLINIC | Age: 24
Discharge: HOME | End: 2024-09-17
Payer: COMMERCIAL

## 2024-09-17 DIAGNOSIS — Z3A.09 9 WEEKS GESTATION OF PREGNANCY (HHS-HCC): ICD-10-CM

## 2024-09-17 LAB — BACTERIA UR CULT: NO GROWTH

## 2024-09-17 PROCEDURE — 76805 OB US >/= 14 WKS SNGL FETUS: CPT | Performed by: OBSTETRICS & GYNECOLOGY

## 2024-09-17 PROCEDURE — 76805 OB US >/= 14 WKS SNGL FETUS: CPT

## 2024-10-10 ENCOUNTER — APPOINTMENT (OUTPATIENT)
Dept: OBSTETRICS AND GYNECOLOGY | Facility: CLINIC | Age: 24
End: 2024-10-10
Payer: COMMERCIAL

## 2024-10-10 VITALS — WEIGHT: 126 LBS | BODY MASS INDEX: 25.45 KG/M2 | DIASTOLIC BLOOD PRESSURE: 70 MMHG | SYSTOLIC BLOOD PRESSURE: 108 MMHG

## 2024-10-10 DIAGNOSIS — Z34.82 PRENATAL CARE, SUBSEQUENT PREGNANCY, SECOND TRIMESTER (HHS-HCC): Primary | ICD-10-CM

## 2024-10-10 DIAGNOSIS — Z3A.22 22 WEEKS GESTATION OF PREGNANCY (HHS-HCC): ICD-10-CM

## 2024-10-10 ASSESSMENT — ENCOUNTER SYMPTOMS
ENDOCRINE NEGATIVE: 0
ALLERGIC/IMMUNOLOGIC NEGATIVE: 0
CARDIOVASCULAR NEGATIVE: 0
GASTROINTESTINAL NEGATIVE: 0
PSYCHIATRIC NEGATIVE: 0
CONSTITUTIONAL NEGATIVE: 0
HEMATOLOGIC/LYMPHATIC NEGATIVE: 0
EYES NEGATIVE: 0
MUSCULOSKELETAL NEGATIVE: 0
NEUROLOGICAL NEGATIVE: 0
RESPIRATORY NEGATIVE: 0
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
## 2024-11-07 ENCOUNTER — APPOINTMENT (OUTPATIENT)
Dept: OBSTETRICS AND GYNECOLOGY | Facility: CLINIC | Age: 24
End: 2024-11-07
Payer: COMMERCIAL

## 2024-11-07 VITALS — BODY MASS INDEX: 26.66 KG/M2 | WEIGHT: 132 LBS | DIASTOLIC BLOOD PRESSURE: 68 MMHG | SYSTOLIC BLOOD PRESSURE: 110 MMHG

## 2024-11-07 DIAGNOSIS — Z3A.22 22 WEEKS GESTATION OF PREGNANCY (HHS-HCC): ICD-10-CM

## 2024-11-07 DIAGNOSIS — Z3A.26 26 WEEKS GESTATION OF PREGNANCY (HHS-HCC): Primary | ICD-10-CM

## 2024-11-07 PROCEDURE — 99213 OFFICE O/P EST LOW 20 MIN: CPT | Performed by: NURSE PRACTITIONER

## 2024-11-07 ASSESSMENT — ENCOUNTER SYMPTOMS
ENDOCRINE NEGATIVE: 0
GASTROINTESTINAL NEGATIVE: 0
PSYCHIATRIC NEGATIVE: 0
NEUROLOGICAL NEGATIVE: 0
CONSTITUTIONAL NEGATIVE: 0
RESPIRATORY NEGATIVE: 0
HEMATOLOGIC/LYMPHATIC NEGATIVE: 0
CARDIOVASCULAR NEGATIVE: 0
MUSCULOSKELETAL NEGATIVE: 0
ALLERGIC/IMMUNOLOGIC NEGATIVE: 0
EYES NEGATIVE: 0

## 2024-11-07 ASSESSMENT — EDINBURGH POSTNATAL DEPRESSION SCALE (EPDS)
I HAVE FELT SAD OR MISERABLE: NO, NOT AT ALL
I HAVE BEEN SO UNHAPPY THAT I HAVE HAD DIFFICULTY SLEEPING: NOT AT ALL
THE THOUGHT OF HARMING MYSELF HAS OCCURRED TO ME: NEVER
I HAVE BEEN ABLE TO LAUGH AND SEE THE FUNNY SIDE OF THINGS: AS MUCH AS I ALWAYS COULD
TOTAL SCORE: 0
I HAVE LOOKED FORWARD WITH ENJOYMENT TO THINGS: AS MUCH AS I EVER DID
I HAVE BEEN ANXIOUS OR WORRIED FOR NO GOOD REASON: NO, NOT AT ALL
I HAVE BEEN SO UNHAPPY THAT I HAVE BEEN CRYING: NO, NEVER
I HAVE FELT SCARED OR PANICKY FOR NO GOOD REASON: NO, NOT AT ALL
THINGS HAVE BEEN GETTING ON TOP OF ME: NO, I HAVE BEEN COPING AS WELL AS EVER
I HAVE BLAMED MYSELF UNNECESSARILY WHEN THINGS WENT WRONG: NO, NEVER

## 2024-11-07 NOTE — PROGRESS NOTES
Ob Visit  24     SUBJECTIVE      HPI: Sarah Reynolds is a 23 y.o.  at 26w2d here for RPNV.  She has no contractions, no bleeding, or LOF. Reports  normal fetal movement.      OBJECTIVE  Visit Vitals  /68   Wt 59.9 kg (132 lb)   LMP  (LMP Unknown)   BMI 26.66 kg/m²   OB Status Pregnant   Smoking Status Never   BSA 1.58 m²            ASSESSMENT & PLAN    Sarah Reynolds is a 23 y.o.  at 26w2d here for the following concerns we addressed today:    Problem List Items Addressed This Visit          Ob-Gyn Problems    26 weeks gestation of pregnancy ([facility]) - Primary    Overview     Desired provider in labor: [] CNM  [] Physician  [x] Blood Products: [x] Yes, accepts [] No, needs counseling  [x] Initial BMI: 25.23   [x] Prenatal Labs:   [] Cervical Cancer Screening up to date  [x] Rh status:  O+  [] Genetic Screening:    [] NT US: (11-13 wks)  [] Baby ASA (if indicated):  [] Pregnancy dated by:     [x] Anatomy US: (19-20 wks)  [] Federal Sterilization consent signed (if indicated):  [] 1hr GCT at 24-28wks: Pending   [] Rhogam (if indicated): N/A  [] Fetal Surveillance (if indicated):  [] Tdap (27-32 wks, may be given up to 36 wks if initial window missed):   [] RSV (32-36 wks) (Sept. to end of ):   [] Flu Vaccine:    [] Breastfeeding:  [] Postpartum Birth control method:   [] GBS at 36 - 37 wks:  [] 39 weeks discussion of IOL vs. Expectant management:  [] Mode of delivery ( anticipated ):     Patient to get 28-week labs today.  Will watch for results.  Patient with many concerns about feeling bigger than she did with her previous pregnancy.  Reassurance is provided.         Relevant Orders    CBC Anemia Panel With Reflex,Pregnancy    Glucose, 1 Hour Screen, Pregnancy    Syphilis Screen with Reflex         RTC in 2 weeks      Tina Valera, APRN-CNP

## 2024-11-19 ENCOUNTER — LAB (OUTPATIENT)
Dept: LAB | Facility: LAB | Age: 24
End: 2024-11-19
Payer: COMMERCIAL

## 2024-11-19 DIAGNOSIS — Z3A.26 26 WEEKS GESTATION OF PREGNANCY (HHS-HCC): ICD-10-CM

## 2024-11-19 DIAGNOSIS — D50.9 IRON DEFICIENCY ANEMIA OF PREGNANCY (HHS-HCC): Primary | ICD-10-CM

## 2024-11-19 DIAGNOSIS — O99.019 IRON DEFICIENCY ANEMIA OF PREGNANCY (HHS-HCC): Primary | ICD-10-CM

## 2024-11-19 LAB
ERYTHROCYTE [DISTWIDTH] IN BLOOD BY AUTOMATED COUNT: 12.4 % (ref 11.5–14.5)
FERRITIN SERPL-MCNC: 23 NG/ML
FOLATE SERPL-MCNC: 12.8 NG/ML
GLUCOSE 1H P 50 G GLC PO SERPL-MCNC: 91 MG/DL
HCT VFR BLD AUTO: 31.9 % (ref 36–46)
HGB BLD-MCNC: 10.1 G/DL (ref 12–16)
IRON SATN MFR SERPL: 12 %
IRON SERPL-MCNC: 55 UG/DL
MCH RBC QN AUTO: 31 PG (ref 26–34)
MCHC RBC AUTO-ENTMCNC: 31.7 G/DL (ref 32–36)
MCV RBC AUTO: 98 FL (ref 80–100)
NRBC BLD-RTO: 0 /100 WBCS (ref 0–0)
PLATELET # BLD AUTO: 174 X10*3/UL (ref 150–450)
RBC # BLD AUTO: 3.26 X10*6/UL (ref 4–5.2)
REFLEX ADDED, ANEMIA PANEL: NORMAL
TIBC SERPL-MCNC: 447 UG/DL
TREPONEMA PALLIDUM IGG+IGM AB [PRESENCE] IN SERUM OR PLASMA BY IMMUNOASSAY: NONREACTIVE
UIBC SERPL-MCNC: 392 UG/DL
VIT B12 SERPL-MCNC: 292 PG/ML
WBC # BLD AUTO: 13.7 X10*3/UL (ref 4.4–11.3)

## 2024-11-19 PROCEDURE — 82728 ASSAY OF FERRITIN: CPT

## 2024-11-19 PROCEDURE — 82607 VITAMIN B-12: CPT

## 2024-11-19 PROCEDURE — 82746 ASSAY OF FOLIC ACID SERUM: CPT

## 2024-11-19 PROCEDURE — 85027 COMPLETE CBC AUTOMATED: CPT

## 2024-11-19 PROCEDURE — 86780 TREPONEMA PALLIDUM: CPT

## 2024-11-19 PROCEDURE — 82947 ASSAY GLUCOSE BLOOD QUANT: CPT

## 2024-11-19 PROCEDURE — 83550 IRON BINDING TEST: CPT

## 2024-11-19 PROCEDURE — 36415 COLL VENOUS BLD VENIPUNCTURE: CPT

## 2024-11-19 RX ORDER — DOCUSATE SODIUM 100 MG/1
100 CAPSULE, LIQUID FILLED ORAL DAILY
Qty: 90 CAPSULE | Refills: 2 | Status: SHIPPED | OUTPATIENT
Start: 2024-11-19 | End: 2025-11-19

## 2024-11-19 RX ORDER — FERROUS GLUCONATE 325 MG
38 TABLET ORAL
Qty: 90 TABLET | Refills: 3 | Status: SHIPPED | OUTPATIENT
Start: 2024-11-19 | End: 2025-11-19

## 2024-11-20 ENCOUNTER — TELEPHONE (OUTPATIENT)
Dept: OBSTETRICS AND GYNECOLOGY | Facility: CLINIC | Age: 24
End: 2024-11-20
Payer: COMMERCIAL

## 2024-11-20 NOTE — TELEPHONE ENCOUNTER
Patient returned Rns call  Verified name and   RN informed patient that her h&h were low and iron was sent to her pharmacy  RN educated patient on directions to take it. RN mentioned it absorbs better with orange juice and to take it with food  RN educated patient that iron supplementation can cause constipation and colace was sent to pharmacy as well  RN stressed importance of medication compliance to prevent IV infusion later in pregnancy/postpartum  Patient understood educational points. All questions and concerns were addressed at the time of the call.     Nina CHENN, RN

## 2024-11-20 NOTE — TELEPHONE ENCOUNTER
RN called patient to discuss test results  No answer at this time.   RN left voicemail encouraging patient to call the office back.   RN to follow up in a couple days.    Nina CHENN, RN

## 2024-11-20 NOTE — TELEPHONE ENCOUNTER
----- Message from Tina Valera sent at 11/19/2024  4:30 PM EST -----  Please notify patient of need to take iron supplementation.  Iron and Colace sent to the pharmacy.

## 2024-11-21 ENCOUNTER — APPOINTMENT (OUTPATIENT)
Dept: OBSTETRICS AND GYNECOLOGY | Facility: CLINIC | Age: 24
End: 2024-11-21
Payer: COMMERCIAL

## 2024-11-21 VITALS — WEIGHT: 136 LBS | SYSTOLIC BLOOD PRESSURE: 117 MMHG | DIASTOLIC BLOOD PRESSURE: 71 MMHG | BODY MASS INDEX: 27.47 KG/M2

## 2024-11-21 DIAGNOSIS — Z3A.28 28 WEEKS GESTATION OF PREGNANCY (HHS-HCC): Primary | ICD-10-CM

## 2024-11-21 DIAGNOSIS — D50.9 IRON DEFICIENCY ANEMIA OF PREGNANCY (HHS-HCC): ICD-10-CM

## 2024-11-21 DIAGNOSIS — Z3A.26 26 WEEKS GESTATION OF PREGNANCY (HHS-HCC): ICD-10-CM

## 2024-11-21 DIAGNOSIS — O99.019 IRON DEFICIENCY ANEMIA OF PREGNANCY (HHS-HCC): ICD-10-CM

## 2024-11-21 PROCEDURE — 99213 OFFICE O/P EST LOW 20 MIN: CPT | Performed by: NURSE PRACTITIONER

## 2024-11-21 ASSESSMENT — ENCOUNTER SYMPTOMS
LOSS OF SENSATION IN FEET: 0
OCCASIONAL FEELINGS OF UNSTEADINESS: 0
DEPRESSION: 0

## 2024-11-21 NOTE — PROGRESS NOTES
Ob Visit  24     SUBJECTIVE      HPI: Sarah Reynolds is a 24 y.o.  at 28w2d here for RPNV.  She has 0 contractions, 0 bleeding, or LOF. Reports normal fetal movement.      OBJECTIVE  Visit Vitals  /71   Wt 61.7 kg (136 lb)   LMP  (LMP Unknown)   BMI 27.47 kg/m²   OB Status Pregnant   Smoking Status Never   BSA 1.6 m²            ASSESSMENT & PLAN    Sarah Reynolds is a 24 y.o.  at 28w2d here for the following concerns we addressed today:    Problem List Items Addressed This Visit          Ob-Gyn Problems    28 weeks gestation of pregnancy ([facility]) - Primary    Overview     Desired provider in labor: [] CNM  [] Physician  [x] Blood Products: [x] Yes, accepts [] No, needs counseling  [x] Initial BMI: 25.23   [x] Prenatal Labs:   [] Cervical Cancer Screening up to date  [x] Rh status:  O+  [] Genetic Screening:    [] NT US: (11-13 wks)  [] Baby ASA (if indicated):  [] Pregnancy dated by:     [x] Anatomy US: (19-20 wks)  [] Federal Sterilization consent signed (if indicated):  [x] 1hr GCT at 24-28wks:   [] Rhogam (if indicated): N/A  [] Fetal Surveillance (if indicated):  [] Tdap (27-32 wks, may be given up to 36 wks if initial window missed):   [] RSV (32-36 wks) (Sept. to end of ):   [] Flu Vaccine:    [] Breastfeeding:  [] Postpartum Birth control method:   [] GBS at 36 - 37 wks:  [] 39 weeks discussion of IOL vs. Expectant management:  [] Mode of delivery ( anticipated ):     Patient to get 28-week labs today.  Will watch for results.  Patient with many concerns about feeling bigger than she did with her previous pregnancy.  Reassurance is provided.            Other    Iron deficiency anemia of pregnancy ([facility])    Overview     Iron supplementation sent to pharmacy patient reports she is taking it.  Colace also provided prophylactically for constipation.              RTC in 2 weeks      Tina Valera, APRN-CNP

## 2024-12-02 ENCOUNTER — OFFICE VISIT (OUTPATIENT)
Dept: OBSTETRICS AND GYNECOLOGY | Facility: CLINIC | Age: 24
End: 2024-12-02
Payer: COMMERCIAL

## 2024-12-02 VITALS
BODY MASS INDEX: 28.2 KG/M2 | DIASTOLIC BLOOD PRESSURE: 72 MMHG | SYSTOLIC BLOOD PRESSURE: 114 MMHG | WEIGHT: 139.9 LBS | HEIGHT: 59 IN

## 2024-12-02 DIAGNOSIS — Z3A.29 29 WEEKS GESTATION OF PREGNANCY (HHS-HCC): ICD-10-CM

## 2024-12-02 DIAGNOSIS — O09.33: Primary | ICD-10-CM

## 2024-12-02 PROCEDURE — 1036F TOBACCO NON-USER: CPT | Performed by: NURSE PRACTITIONER

## 2024-12-02 PROCEDURE — 99214 OFFICE O/P EST MOD 30 MIN: CPT | Performed by: NURSE PRACTITIONER

## 2024-12-02 PROCEDURE — 3008F BODY MASS INDEX DOCD: CPT | Performed by: NURSE PRACTITIONER

## 2024-12-02 NOTE — PROGRESS NOTES
Pt transfer from another  clinic  Issues today: occas gets a HA coming about 2-3 times per week, like stabbing pain left side but quick and gone often goes down neck as well, does not need to take meds, does not affect vision at all, BP normal. States starting to have left sciatica pains as well, discussed. Reviewed when to call  Briefly discussed tdap for next visit and written info given.  No Vag bleeding/LOF  No Ctx/abd pain  No Dysuria/abn discharge  FM's: active  Discussed PTL precautions/FM's and when to call  F/U 2 weeks  Trish Lomeli, APRN-CNP

## 2024-12-05 ENCOUNTER — APPOINTMENT (OUTPATIENT)
Dept: OBSTETRICS AND GYNECOLOGY | Facility: CLINIC | Age: 24
End: 2024-12-05
Payer: COMMERCIAL

## 2024-12-18 ENCOUNTER — APPOINTMENT (OUTPATIENT)
Dept: OBSTETRICS AND GYNECOLOGY | Facility: CLINIC | Age: 24
End: 2024-12-18
Payer: COMMERCIAL

## 2024-12-18 VITALS — DIASTOLIC BLOOD PRESSURE: 73 MMHG | SYSTOLIC BLOOD PRESSURE: 109 MMHG | WEIGHT: 141.8 LBS | BODY MASS INDEX: 28.64 KG/M2

## 2024-12-18 DIAGNOSIS — Z34.83 ENCOUNTER FOR SUPERVISION OF OTHER NORMAL PREGNANCY, THIRD TRIMESTER: Primary | ICD-10-CM

## 2024-12-18 DIAGNOSIS — O26.843 UTERINE SIZE-DATE DISCREPANCY IN THIRD TRIMESTER (HHS-HCC): ICD-10-CM

## 2024-12-18 DIAGNOSIS — Z3A.32 32 WEEKS GESTATION OF PREGNANCY (HHS-HCC): ICD-10-CM

## 2024-12-18 PROCEDURE — 99214 OFFICE O/P EST MOD 30 MIN: CPT | Performed by: NURSE PRACTITIONER

## 2024-12-18 NOTE — PROGRESS NOTES
Issues today: noticing BH ctxs occas, discussed. Also feeling hot all of a sudden and sometimes dizzy for few sec, discussed at length. Would like to BF, pump mostly, reviewed taking out nipple rings for pumping. Will plan for tdap next visit, will see in [place]  Enc pt to continue daily Fe supp for anemia  No Vag bleeding/LOF  No Ctx/abd pain  No Dysuria/abn discharge  FM's: active  Discussed PTL precautions/FKC's and when to call  F/U 2 weeks, growth US S<D, plan for tdap  Trish Lomeli, APRN-CNP

## 2024-12-20 ENCOUNTER — HOSPITAL ENCOUNTER (OUTPATIENT)
Facility: HOSPITAL | Age: 24
Discharge: HOME | End: 2024-12-20
Attending: OBSTETRICS & GYNECOLOGY | Admitting: OBSTETRICS & GYNECOLOGY
Payer: COMMERCIAL

## 2024-12-20 ENCOUNTER — HOSPITAL ENCOUNTER (OUTPATIENT)
Facility: HOSPITAL | Age: 24
End: 2024-12-20
Attending: OBSTETRICS & GYNECOLOGY | Admitting: OBSTETRICS & GYNECOLOGY
Payer: COMMERCIAL

## 2024-12-20 VITALS
SYSTOLIC BLOOD PRESSURE: 108 MMHG | BODY MASS INDEX: 29.02 KG/M2 | HEIGHT: 59 IN | OXYGEN SATURATION: 100 % | HEART RATE: 85 BPM | TEMPERATURE: 97.9 F | DIASTOLIC BLOOD PRESSURE: 67 MMHG | WEIGHT: 143.96 LBS | RESPIRATION RATE: 17 BRPM

## 2024-12-20 PROCEDURE — 4500999001 HC ED NO CHARGE

## 2024-12-20 PROCEDURE — 99214 OFFICE O/P EST MOD 30 MIN: CPT | Mod: 25

## 2024-12-20 PROCEDURE — 99213 OFFICE O/P EST LOW 20 MIN: CPT

## 2024-12-20 PROCEDURE — 59025 FETAL NON-STRESS TEST: CPT

## 2024-12-20 SDOH — SOCIAL STABILITY: SOCIAL INSECURITY: HAVE YOU HAD THOUGHTS OF HARMING ANYONE ELSE?: NO

## 2024-12-20 SDOH — SOCIAL STABILITY: SOCIAL INSECURITY: ARE THERE ANY APPARENT SIGNS OF INJURIES/BEHAVIORS THAT COULD BE RELATED TO ABUSE/NEGLECT?: NO

## 2024-12-20 SDOH — HEALTH STABILITY: MENTAL HEALTH: NON-SPECIFIC ACTIVE SUICIDAL THOUGHTS (PAST 1 MONTH): NO

## 2024-12-20 SDOH — HEALTH STABILITY: MENTAL HEALTH: HAVE YOU USED ANY SUBSTANCES (CANABIS, COCAINE, HEROIN, HALLUCINOGENS, INHALANTS, ETC.) IN THE PAST 12 MONTHS?: NO

## 2024-12-20 SDOH — SOCIAL STABILITY: SOCIAL INSECURITY: HAS ANYONE EVER THREATENED TO HURT YOUR FAMILY OR YOUR PETS?: NO

## 2024-12-20 SDOH — SOCIAL STABILITY: SOCIAL INSECURITY: HAVE YOU HAD ANY THOUGHTS OF HARMING ANYONE ELSE?: NO

## 2024-12-20 SDOH — SOCIAL STABILITY: SOCIAL INSECURITY: DOES ANYONE TRY TO KEEP YOU FROM HAVING/CONTACTING OTHER FRIENDS OR DOING THINGS OUTSIDE YOUR HOME?: NO

## 2024-12-20 SDOH — HEALTH STABILITY: MENTAL HEALTH: SUICIDAL BEHAVIOR (LIFETIME): NO

## 2024-12-20 SDOH — SOCIAL STABILITY: SOCIAL INSECURITY: PHYSICAL ABUSE: DENIES

## 2024-12-20 SDOH — HEALTH STABILITY: MENTAL HEALTH: WISH TO BE DEAD (PAST 1 MONTH): NO

## 2024-12-20 SDOH — SOCIAL STABILITY: SOCIAL INSECURITY: ABUSE SCREEN: ADULT

## 2024-12-20 SDOH — HEALTH STABILITY: MENTAL HEALTH: HAVE YOU USED ANY PRESCRIPTION DRUGS OTHER THAN PRESCRIBED IN THE PAST 12 MONTHS?: NO

## 2024-12-20 SDOH — SOCIAL STABILITY: SOCIAL INSECURITY: DO YOU FEEL ANYONE HAS EXPLOITED OR TAKEN ADVANTAGE OF YOU FINANCIALLY OR OF YOUR PERSONAL PROPERTY?: NO

## 2024-12-20 SDOH — SOCIAL STABILITY: SOCIAL INSECURITY: ARE YOU OR HAVE YOU BEEN THREATENED OR ABUSED PHYSICALLY, EMOTIONALLY, OR SEXUALLY BY ANYONE?: NO

## 2024-12-20 SDOH — HEALTH STABILITY: MENTAL HEALTH: WERE YOU ABLE TO COMPLETE ALL THE BEHAVIORAL HEALTH SCREENINGS?: YES

## 2024-12-20 SDOH — ECONOMIC STABILITY: HOUSING INSECURITY: DO YOU FEEL UNSAFE GOING BACK TO THE PLACE WHERE YOU ARE LIVING?: NO

## 2024-12-20 SDOH — SOCIAL STABILITY: SOCIAL INSECURITY: VERBAL ABUSE: DENIES

## 2024-12-20 ASSESSMENT — LIFESTYLE VARIABLES
SKIP TO QUESTIONS 9-10: 1
HOW MANY STANDARD DRINKS CONTAINING ALCOHOL DO YOU HAVE ON A TYPICAL DAY: PATIENT DOES NOT DRINK
AUDIT-C TOTAL SCORE: 0
HOW OFTEN DO YOU HAVE A DRINK CONTAINING ALCOHOL: NEVER
AUDIT-C TOTAL SCORE: 0
HOW OFTEN DO YOU HAVE 6 OR MORE DRINKS ON ONE OCCASION: NEVER

## 2024-12-20 ASSESSMENT — PATIENT HEALTH QUESTIONNAIRE - PHQ9
2. FEELING DOWN, DEPRESSED OR HOPELESS: NOT AT ALL
1. LITTLE INTEREST OR PLEASURE IN DOING THINGS: NOT AT ALL
SUM OF ALL RESPONSES TO PHQ9 QUESTIONS 1 & 2: 0

## 2024-12-20 ASSESSMENT — PAIN SCALES - GENERAL: PAINLEVEL_OUTOF10: 6

## 2024-12-20 ASSESSMENT — COLUMBIA-SUICIDE SEVERITY RATING SCALE - C-SSRS
1. IN THE PAST MONTH, HAVE YOU WISHED YOU WERE DEAD OR WISHED YOU COULD GO TO SLEEP AND NOT WAKE UP?: NO
6. HAVE YOU EVER DONE ANYTHING, STARTED TO DO ANYTHING, OR PREPARED TO DO ANYTHING TO END YOUR LIFE?: NO
2. HAVE YOU ACTUALLY HAD ANY THOUGHTS OF KILLING YOURSELF?: NO

## 2024-12-20 NOTE — H&P
OB Triage H&P    Assessment/Plan    Sarah Reynolds is a 24 y.o.  at 32w3d, JOSSY: 2025, by Ultrasound, who presents to triage with contractions and decreased fetal movement.    Plan      Decreased Fetal Movement, IUP at 32.3 wga  - , moderate variability, + Accelerations, - decelerations category I tracing  - [place]: rare ctx  - NST reactive  - Good fetal movement in triage lying on her side (anterior placenta)  - Continue routine prenatal care  - Growth US for S<D scheduled for   - Next appt       Dispo  -Patient appropriate for discharge home, agrees with plan  -Return precautions discussed   -Follow up at next scheduled OB appointment or to triage sooner as needed    Discussed plan and reviewed with: Dr. Rosenthal    9w3d Problems (from 24 to present)       Problem Noted Diagnosed Resolved    Iron deficiency anemia of pregnancy ([facility]) 2024 by ARTUR Ayon  No    Priority:  Medium       Overview Signed 2024  3:22 PM by ARTUR Ayon     Iron supplementation sent to pharmacy patient reports she is taking it.  Colace also provided prophylactically for constipation.         28 weeks gestation of pregnancy ([facility]) 10/10/2024 by ARTUR Ayon  No    Priority:  Medium       Overview Addendum 2024  3:21 PM by ARTUR Ayon     Desired provider in labor: [] CNM  [] Physician  [x] Blood Products: [x] Yes, accepts [] No, needs counseling  [x] Initial BMI: 25.23   [x] Prenatal Labs:   [] Cervical Cancer Screening up to date  [x] Rh status:  O+  [] Genetic Screening:    [] NT US: (11-13 wks)  [] Baby ASA (if indicated):  [] Pregnancy dated by:     [x] Anatomy US: (19-20 wks)  [] Federal Sterilization consent signed (if indicated):  [x] 1hr GCT at 24-28wks:   [] Rhogam (if indicated): N/A  [] Fetal Surveillance (if indicated):  [] Tdap (27-32 wks, may be given up to 36 wks if initial window missed):   [] RSV (32-36  wks) (Sept. to end ):   [] Flu Vaccine:    [] Breastfeeding:  [] Postpartum Birth control method:   [] GBS at 36 - 37 wks:  [] 39 weeks discussion of IOL vs. Expectant management:  [] Mode of delivery ( anticipated ):     Patient to get 28-week labs today.  Will watch for results.  Patient with many concerns about feeling bigger than she did with her previous pregnancy.  Reassurance is provided.                 Subjective   Decreased fetal movement.  Denies vaginal bleeding., C/O of occasional contractions., Denies leaking of fluid.      Decreased fetal movement today. Fetal movement heard on cEFM, but patient not feeling movement while lying on her back.  Contractions x1 per hour. Denies vaginal or urinary symptoms of infection.    Prenatal Provider Tina Valera    OB History    Para Term  AB Living   3 1 1 0 1 1   SAB IAB Ectopic Multiple Live Births   0 1 0 0 1      # Outcome Date GA Lbr Oswaldo/2nd Weight Sex Type Anes PTL Lv   3 Current            2 IAB 24           1 Term 23 39w0d  2.778 kg M Vag-Spont EPI N LUDIN      Name: Troy       No past surgical history on file.    Social History     Tobacco Use    Smoking status: Never    Smokeless tobacco: Never   Substance Use Topics    Alcohol use: Not Currently       No Known Allergies    Medications Prior to Admission   Medication Sig Dispense Refill Last Dose/Taking    clindamycin (Cleocin T) 1 % lotion Clindamycin Phosphate 1 % External Lotion   Quantity: 60  Refills: 0        Start : 23-Oct-2020   Active       docusate sodium (Colace) 100 mg capsule Take 1 capsule (100 mg) by mouth once daily. 90 capsule 2     ferrous gluconate (Fergon) 324 (38 Fe) mg tablet Take 1 tablet (38 mg of iron) by mouth once daily with breakfast. 90 tablet 3     fluticasone (Flonase) 50 mcg/actuation nasal spray Fluticasone Propionate 50 MCG/ACT Nasal Suspension   Quantity: 16  Refills: 0        Start : 2022   Active       prenatal vit no.129-iron-folic  (Prenatal One Daily) 27 mg iron- 800 mcg tablet Take 1 tablet by mouth once daily.        Objective     Last Vitals  Temp Pulse Resp BP MAP O2 Sat   36.6 °C (97.9 °F) 80 20 112/65 84 100 %     Blood Pressures         12/20/2024  1440 12/20/2024  1535          BP: 110/66 112/65               Physical Exam  General: NAD, mood appropriate  Cardiopulmonary: warm and well perfused, breathing comfortably on room air  Abdomen: Gravid, non-tender  Extremities: Symmetric  Speculum Exam: deferred  Cervix:   /  /       Fetal Monitoring  Baseline: 135 bpm, Variability: moderate,  Accelerations: present and Decelerations: none  Uterine Activity: Irregular contractions, rare  Interpretation: Reactive    Bedside ultrasound: No    Labs in chart were reviewed.

## 2024-12-20 NOTE — ED TRIAGE NOTES
Patient states she's approximately 32 weeks pregnant (JOSSY 24). Patient complains of abdominal pain and cramping that is worsening. Patient denies any vaginal bleeding/discharge. .

## 2024-12-27 ENCOUNTER — HOSPITAL ENCOUNTER (OUTPATIENT)
Dept: RADIOLOGY | Facility: HOSPITAL | Age: 24
Discharge: HOME | End: 2024-12-27
Payer: COMMERCIAL

## 2024-12-27 DIAGNOSIS — Z3A.32 32 WEEKS GESTATION OF PREGNANCY (HHS-HCC): ICD-10-CM

## 2024-12-27 DIAGNOSIS — O26.843 UTERINE SIZE-DATE DISCREPANCY IN THIRD TRIMESTER (HHS-HCC): ICD-10-CM

## 2024-12-27 PROCEDURE — 76816 OB US FOLLOW-UP PER FETUS: CPT

## 2025-01-07 ENCOUNTER — APPOINTMENT (OUTPATIENT)
Dept: OBSTETRICS AND GYNECOLOGY | Facility: CLINIC | Age: 25
End: 2025-01-07
Payer: COMMERCIAL

## 2025-01-07 VITALS — BODY MASS INDEX: 28.86 KG/M2 | WEIGHT: 142.9 LBS | DIASTOLIC BLOOD PRESSURE: 75 MMHG | SYSTOLIC BLOOD PRESSURE: 110 MMHG

## 2025-01-07 DIAGNOSIS — Z34.83 ENCOUNTER FOR SUPERVISION OF OTHER NORMAL PREGNANCY, THIRD TRIMESTER: ICD-10-CM

## 2025-01-07 DIAGNOSIS — Z3A.35 35 WEEKS GESTATION OF PREGNANCY (HHS-HCC): Primary | ICD-10-CM

## 2025-01-07 DIAGNOSIS — Z71.85 VACCINE COUNSELING: ICD-10-CM

## 2025-01-07 DIAGNOSIS — Z23 NEED FOR TDAP VACCINATION: ICD-10-CM

## 2025-01-07 PROCEDURE — 90715 TDAP VACCINE 7 YRS/> IM: CPT | Performed by: NURSE PRACTITIONER

## 2025-01-07 PROCEDURE — 90471 IMMUNIZATION ADMIN: CPT | Performed by: NURSE PRACTITIONER

## 2025-01-07 PROCEDURE — 99214 OFFICE O/P EST MOD 30 MIN: CPT | Performed by: NURSE PRACTITIONER

## 2025-01-07 NOTE — PATIENT INSTRUCTIONS
The ARRIVE Trial (A Randomized Trial of Induction Versus Expectant Management) is a clinical study that looked at the effects of inducing labor at 39 weeks of pregnancy compared to waiting for labor to begin naturally. Here’s how you can explain it to a pregnant patient in simple, easy-to-understand terms:     1. What Was the Study About?  The ARRIVE Trial was designed to see if inducing labor at 39 weeks (a little before your due date) for healthy, first-time moms might lead to better outcomes compared to letting the pregnancy continue naturally until labor starts on its own.    2. What Did They Find?  Lower  rate: The study found that women who were induced at 39 weeks actually had a lower chance of needing a  compared to those who waited for labor to begin naturally.  No increase in complications for the baby: There was no difference in the number of babies who had serious complications between the two groups.  Less risk of high blood pressure: Women who were induced at 39 weeks also had a lower risk of developing high blood pressure related to pregnancy, which can be dangerous for both mom and baby.    3. What Does This Mean for You?  If your pregnancy is healthy and uncomplicated, and you’re approaching 39 weeks, inducing labor might be an option your doctor will discuss with you.  It may lower your risk of a  and could reduce the chance of developing pregnancy complications like high blood pressure.  This doesn't mean that all women need to be induced at 39 weeks, but it gives you and your doctor more options when thinking about how to manage the last part of your pregnancy.    4. Is This Right for Everyone?  The ARRIVE Trial only included healthy women having their first baby. So if you have certain complications (like diabetes, high blood pressure, or a previous ), the findings might not apply to you.  It’s important to remember that induction has some risks and benefits,  and this decision is personal. You can talk with your doctor to decide what’s best for you and your baby.    5. Why 39 Weeks?  By 39 weeks, your baby is fully developed, and induction is considered safe at this time.  Waiting longer (after 40 weeks) increases the chances of certain complications, like stillbirth, although these risks are still small. Induction at 39 weeks may help reduce those risks.    6. What Should You Do Next?  Discuss with your healthcare provider: If you’re close to 39 weeks, your doctor may talk to you about the possibility of induction. It’s important to ask questions and make sure you understand what’s right for your specific situation.  Remember: Whether or not to be induced is a shared decision between you and your doctor. The ARRIVE Trial just provides more information to help you make that choice.  This explanation helps highlight the key points of the ARRIVE Trial in a patient-friendly way, emphasizing that it's an option for a safe and healthy pregnancy, but not necessarily something every pregnant woman will need or choose.

## 2025-01-07 NOTE — PROGRESS NOTES
Issues today: pt denies concerns today, feeling good more pressure in pelvis at times, good FM and increased heartburn at times, discussed; recent growth US nml and reviewed with pt  Discussed ARRIVE trial and rr IOL at 39 weeks  Patient was counseled on the recommendation for and benefits of Tdap vaccination during pregnancy. We discussed the passive immunity that occurs after maternal vaccination during pregnancy, and the antibodies that cross the placenta to the fetus to protect baby in the first few months of life. Babies do not receive their first vaccine for pertussis/whooping cough until 2 months of life, during which the baby is vulnerable to this bacterial infection. Whooping cough can cause severe and even life-threatening infections, and maternal vaccination between 27 and 36 weeks of pregnancy is the best method to protect baby from whooping cough until they are eligible for the vaccine. After discussion the patient accepts the vaccine. Greater than 5 minutes were spent on counseling related to vaccine recommendations and safety.    No Vag bleeding/LOF  Occas BH Ctx/abd pain  No Dysuria/abn discharge  FM's: active  Discussed PTL precautions/FKC's and when to call  F/U 1 week, tdap done today  Trish Lomeli, APRN-CNP

## 2025-01-16 ENCOUNTER — APPOINTMENT (OUTPATIENT)
Dept: OBSTETRICS AND GYNECOLOGY | Facility: CLINIC | Age: 25
End: 2025-01-16
Payer: COMMERCIAL

## 2025-01-16 VITALS — DIASTOLIC BLOOD PRESSURE: 67 MMHG | BODY MASS INDEX: 29.15 KG/M2 | WEIGHT: 144.3 LBS | SYSTOLIC BLOOD PRESSURE: 115 MMHG

## 2025-01-16 DIAGNOSIS — Z34.93 PRENATAL CARE IN THIRD TRIMESTER (HHS-HCC): Primary | ICD-10-CM

## 2025-01-16 PROCEDURE — 87081 CULTURE SCREEN ONLY: CPT

## 2025-01-16 NOTE — PROGRESS NOTES
Prenatal visit at 36 weeks and 2 days    Patient denies leaking fluid, bleeding or contractions. Patient admits to good fetal movement.    Prenatal problem list:    VD x1  Rr cfDNA neg, XX  Anatomy nml  Fe supp  Glucola nml  Peds [facility]  Growth US nml 23%ile  Tdap given 1/7/2025    /67   Wt 65.5 kg (144 lb 4.8 oz)   LMP  (LMP Unknown)   BMI 29.15 kg/m²     Assessment and plan: Labor precautions, fetal kick counts at home, follow-up in 1 week.  GBS done.

## 2025-01-18 LAB — GP B STREP GENITAL QL CULT: NORMAL

## 2025-01-23 ENCOUNTER — APPOINTMENT (OUTPATIENT)
Dept: OBSTETRICS AND GYNECOLOGY | Facility: CLINIC | Age: 25
End: 2025-01-23
Payer: COMMERCIAL

## 2025-01-23 VITALS — DIASTOLIC BLOOD PRESSURE: 69 MMHG | BODY MASS INDEX: 29.51 KG/M2 | WEIGHT: 146.1 LBS | SYSTOLIC BLOOD PRESSURE: 106 MMHG

## 2025-01-23 DIAGNOSIS — Z34.83 ENCOUNTER FOR SUPERVISION OF OTHER NORMAL PREGNANCY, THIRD TRIMESTER: Primary | ICD-10-CM

## 2025-01-23 DIAGNOSIS — Z3A.37 37 WEEKS GESTATION OF PREGNANCY (HHS-HCC): ICD-10-CM

## 2025-01-23 NOTE — PROGRESS NOTES
Issues today: mild LE edema, improves with elevation. Denies HA, visual changes and epigastric pain. Good FM. May be interested in rr 39w IOL, discussed. GBS negative.  No Vag bleeding/LOF  Occas BH Ctx/abd pain  No Dysuria/abn discharge  FM's: active  Discussed labor precautions/FKC's and when to call  F/U 1 week  Trish Lomeli, APRN-CNP

## 2025-01-24 ENCOUNTER — APPOINTMENT (OUTPATIENT)
Dept: OBSTETRICS AND GYNECOLOGY | Facility: CLINIC | Age: 25
End: 2025-01-24
Payer: COMMERCIAL

## 2025-01-30 ENCOUNTER — APPOINTMENT (OUTPATIENT)
Dept: OBSTETRICS AND GYNECOLOGY | Facility: CLINIC | Age: 25
End: 2025-01-30
Payer: COMMERCIAL

## 2025-01-30 VITALS — DIASTOLIC BLOOD PRESSURE: 73 MMHG | SYSTOLIC BLOOD PRESSURE: 110 MMHG | WEIGHT: 144.7 LBS | BODY MASS INDEX: 29.23 KG/M2

## 2025-01-30 DIAGNOSIS — Z3A.39 39 WEEKS GESTATION OF PREGNANCY (HHS-HCC): Primary | ICD-10-CM

## 2025-01-30 DIAGNOSIS — Z34.93 PRENATAL CARE IN THIRD TRIMESTER (HHS-HCC): Primary | ICD-10-CM

## 2025-01-30 PROCEDURE — 99213 OFFICE O/P EST LOW 20 MIN: CPT | Performed by: OBSTETRICS & GYNECOLOGY

## 2025-01-30 NOTE — PROGRESS NOTES
Prenatal visit at 38 weeks and 2 days    Patient denies leaking fluid, bleeding or contractions. Patient admits to good fetal movement.    Prenatal problem list:     x1  Rr cfDNA neg, XX  Anatomy nml  Fe supp  Glucola nml  Peds [facility]  Growth US nml 23%ile  Tdap given 2025  GBS neg  DECLINED FLU VACC-25    /73   Wt 65.6 kg (144 lb 11.2 oz)   LMP  (LMP Unknown)   BMI 29.23 kg/m²     Assessment and plan: Labor precautions, fetal kick counts at home, patient would like 39-week risk reducing induction of labor.  Will schedule and contact patient with dates and times.  Otherwise follow-up in 1 week.

## 2025-02-04 ENCOUNTER — ANESTHESIA (OUTPATIENT)
Dept: OBSTETRICS AND GYNECOLOGY | Facility: HOSPITAL | Age: 25
End: 2025-02-04
Payer: COMMERCIAL

## 2025-02-04 ENCOUNTER — ANESTHESIA EVENT (OUTPATIENT)
Dept: OBSTETRICS AND GYNECOLOGY | Facility: HOSPITAL | Age: 25
End: 2025-02-04
Payer: COMMERCIAL

## 2025-02-04 ENCOUNTER — APPOINTMENT (OUTPATIENT)
Dept: OBSTETRICS AND GYNECOLOGY | Facility: CLINIC | Age: 25
End: 2025-02-04
Payer: COMMERCIAL

## 2025-02-04 ENCOUNTER — HOSPITAL ENCOUNTER (INPATIENT)
Facility: HOSPITAL | Age: 25
LOS: 2 days | Discharge: HOME | End: 2025-02-06
Attending: SPECIALIST | Admitting: OBSTETRICS & GYNECOLOGY
Payer: COMMERCIAL

## 2025-02-04 VITALS — BODY MASS INDEX: 29.59 KG/M2 | DIASTOLIC BLOOD PRESSURE: 81 MMHG | SYSTOLIC BLOOD PRESSURE: 123 MMHG | WEIGHT: 146.5 LBS

## 2025-02-04 DIAGNOSIS — Z30.017 NEXPLANON INSERTION: Primary | ICD-10-CM

## 2025-02-04 DIAGNOSIS — Z34.93 PRENATAL CARE IN THIRD TRIMESTER (HHS-HCC): Primary | ICD-10-CM

## 2025-02-04 PROBLEM — Z3A.39 39 WEEKS GESTATION OF PREGNANCY (HHS-HCC): Status: ACTIVE | Noted: 2025-02-04

## 2025-02-04 LAB
ABO GROUP (TYPE) IN BLOOD: NORMAL
ANTIBODY SCREEN: NORMAL
ERYTHROCYTE [DISTWIDTH] IN BLOOD BY AUTOMATED COUNT: 12.2 % (ref 11.5–14.5)
HCT VFR BLD AUTO: 32.3 % (ref 36–46)
HGB BLD-MCNC: 10.3 G/DL (ref 12–16)
MCH RBC QN AUTO: 28.5 PG (ref 26–34)
MCHC RBC AUTO-ENTMCNC: 31.9 G/DL (ref 32–36)
MCV RBC AUTO: 89 FL (ref 80–100)
NRBC BLD-RTO: 0 /100 WBCS (ref 0–0)
PLATELET # BLD AUTO: 182 X10*3/UL (ref 150–450)
RBC # BLD AUTO: 3.62 X10*6/UL (ref 4–5.2)
RH FACTOR (ANTIGEN D): NORMAL
TREPONEMA PALLIDUM IGG+IGM AB [PRESENCE] IN SERUM OR PLASMA BY IMMUNOASSAY: NONREACTIVE
WBC # BLD AUTO: 12.7 X10*3/UL (ref 4.4–11.3)

## 2025-02-04 PROCEDURE — 2500000004 HC RX 250 GENERAL PHARMACY W/ HCPCS (ALT 636 FOR OP/ED): Mod: SE | Performed by: STUDENT IN AN ORGANIZED HEALTH CARE EDUCATION/TRAINING PROGRAM

## 2025-02-04 PROCEDURE — 0501F PRENATAL FLOW SHEET: CPT | Performed by: OBSTETRICS & GYNECOLOGY

## 2025-02-04 PROCEDURE — 7100000016 HC LABOR RECOVERY PER HOUR

## 2025-02-04 PROCEDURE — 1210000001 HC SEMI-PRIVATE ROOM DAILY

## 2025-02-04 PROCEDURE — 1100000001 HC PRIVATE ROOM DAILY

## 2025-02-04 PROCEDURE — 85027 COMPLETE CBC AUTOMATED: CPT | Performed by: STUDENT IN AN ORGANIZED HEALTH CARE EDUCATION/TRAINING PROGRAM

## 2025-02-04 PROCEDURE — 86780 TREPONEMA PALLIDUM: CPT | Performed by: STUDENT IN AN ORGANIZED HEALTH CARE EDUCATION/TRAINING PROGRAM

## 2025-02-04 PROCEDURE — 59050 FETAL MONITOR W/REPORT: CPT

## 2025-02-04 PROCEDURE — 86850 RBC ANTIBODY SCREEN: CPT | Performed by: STUDENT IN AN ORGANIZED HEALTH CARE EDUCATION/TRAINING PROGRAM

## 2025-02-04 PROCEDURE — 51701 INSERT BLADDER CATHETER: CPT

## 2025-02-04 PROCEDURE — 2500000001 HC RX 250 WO HCPCS SELF ADMINISTERED DRUGS (ALT 637 FOR MEDICARE OP): Mod: SE | Performed by: STUDENT IN AN ORGANIZED HEALTH CARE EDUCATION/TRAINING PROGRAM

## 2025-02-04 PROCEDURE — 59409 OBSTETRICAL CARE: CPT

## 2025-02-04 PROCEDURE — 3700000014 EPIDURAL BLOCK: Performed by: ANESTHESIOLOGY

## 2025-02-04 PROCEDURE — 59409 OBSTETRICAL CARE: CPT | Performed by: OBSTETRICS & GYNECOLOGY

## 2025-02-04 PROCEDURE — 10907ZC DRAINAGE OF AMNIOTIC FLUID, THERAPEUTIC FROM PRODUCTS OF CONCEPTION, VIA NATURAL OR ARTIFICIAL OPENING: ICD-10-PCS | Performed by: OBSTETRICS & GYNECOLOGY

## 2025-02-04 PROCEDURE — 36415 COLL VENOUS BLD VENIPUNCTURE: CPT | Performed by: STUDENT IN AN ORGANIZED HEALTH CARE EDUCATION/TRAINING PROGRAM

## 2025-02-04 PROCEDURE — 7210000002 HC LABOR PER HOUR

## 2025-02-04 PROCEDURE — 2500000004 HC RX 250 GENERAL PHARMACY W/ HCPCS (ALT 636 FOR OP/ED): Mod: SE | Performed by: ANESTHESIOLOGY

## 2025-02-04 RX ORDER — OXYTOCIN/0.9 % SODIUM CHLORIDE 30/500 ML
60 PLASTIC BAG, INJECTION (ML) INTRAVENOUS ONCE AS NEEDED
Status: DISCONTINUED | OUTPATIENT
Start: 2025-02-04 | End: 2025-02-04 | Stop reason: HOSPADM

## 2025-02-04 RX ORDER — HYDRALAZINE HYDROCHLORIDE 20 MG/ML
5 INJECTION INTRAMUSCULAR; INTRAVENOUS ONCE AS NEEDED
Status: DISCONTINUED | OUTPATIENT
Start: 2025-02-04 | End: 2025-02-04 | Stop reason: HOSPADM

## 2025-02-04 RX ORDER — LIDOCAINE HYDROCHLORIDE 10 MG/ML
30 INJECTION, SOLUTION INFILTRATION; PERINEURAL ONCE AS NEEDED
Status: DISCONTINUED | OUTPATIENT
Start: 2025-02-04 | End: 2025-02-04 | Stop reason: HOSPADM

## 2025-02-04 RX ORDER — TRANEXAMIC ACID 100 MG/ML
1000 INJECTION, SOLUTION INTRAVENOUS ONCE AS NEEDED
Status: DISCONTINUED | OUTPATIENT
Start: 2025-02-04 | End: 2025-02-04 | Stop reason: HOSPADM

## 2025-02-04 RX ORDER — ONDANSETRON 4 MG/1
4 TABLET, FILM COATED ORAL EVERY 6 HOURS PRN
Status: DISCONTINUED | OUTPATIENT
Start: 2025-02-04 | End: 2025-02-04 | Stop reason: HOSPADM

## 2025-02-04 RX ORDER — ONDANSETRON HYDROCHLORIDE 2 MG/ML
4 INJECTION, SOLUTION INTRAVENOUS EVERY 6 HOURS PRN
Status: DISCONTINUED | OUTPATIENT
Start: 2025-02-04 | End: 2025-02-04 | Stop reason: HOSPADM

## 2025-02-04 RX ORDER — OXYTOCIN 10 [USP'U]/ML
10 INJECTION, SOLUTION INTRAMUSCULAR; INTRAVENOUS ONCE AS NEEDED
Status: DISCONTINUED | OUTPATIENT
Start: 2025-02-04 | End: 2025-02-04 | Stop reason: HOSPADM

## 2025-02-04 RX ORDER — ACETAMINOPHEN 325 MG/1
975 TABLET ORAL EVERY 6 HOURS
Status: DISCONTINUED | OUTPATIENT
Start: 2025-02-04 | End: 2025-02-06 | Stop reason: HOSPADM

## 2025-02-04 RX ORDER — SODIUM CHLORIDE, SODIUM LACTATE, POTASSIUM CHLORIDE, CALCIUM CHLORIDE 600; 310; 30; 20 MG/100ML; MG/100ML; MG/100ML; MG/100ML
75 INJECTION, SOLUTION INTRAVENOUS CONTINUOUS
Status: DISCONTINUED | OUTPATIENT
Start: 2025-02-04 | End: 2025-02-05

## 2025-02-04 RX ORDER — NIFEDIPINE 10 MG/1
10 CAPSULE ORAL ONCE AS NEEDED
Status: DISCONTINUED | OUTPATIENT
Start: 2025-02-04 | End: 2025-02-04 | Stop reason: HOSPADM

## 2025-02-04 RX ORDER — CARBOPROST TROMETHAMINE 250 UG/ML
250 INJECTION, SOLUTION INTRAMUSCULAR ONCE AS NEEDED
Status: DISCONTINUED | OUTPATIENT
Start: 2025-02-04 | End: 2025-02-04 | Stop reason: HOSPADM

## 2025-02-04 RX ORDER — METHYLERGONOVINE MALEATE 0.2 MG/ML
0.2 INJECTION INTRAVENOUS ONCE AS NEEDED
Status: COMPLETED | OUTPATIENT
Start: 2025-02-04 | End: 2025-02-04

## 2025-02-04 RX ORDER — LOPERAMIDE HYDROCHLORIDE 2 MG/1
4 CAPSULE ORAL EVERY 2 HOUR PRN
Status: DISCONTINUED | OUTPATIENT
Start: 2025-02-04 | End: 2025-02-04 | Stop reason: HOSPADM

## 2025-02-04 RX ORDER — IBUPROFEN 600 MG/1
600 TABLET ORAL EVERY 6 HOURS
Status: DISCONTINUED | OUTPATIENT
Start: 2025-02-04 | End: 2025-02-06 | Stop reason: HOSPADM

## 2025-02-04 RX ORDER — MISOPROSTOL 200 UG/1
800 TABLET ORAL ONCE AS NEEDED
Status: DISCONTINUED | OUTPATIENT
Start: 2025-02-04 | End: 2025-02-04 | Stop reason: HOSPADM

## 2025-02-04 RX ORDER — LABETALOL HYDROCHLORIDE 5 MG/ML
20 INJECTION, SOLUTION INTRAVENOUS ONCE AS NEEDED
Status: DISCONTINUED | OUTPATIENT
Start: 2025-02-04 | End: 2025-02-04 | Stop reason: HOSPADM

## 2025-02-04 RX ORDER — FENTANYL/ROPIVACAINE/NS/PF 2MCG/ML-.2
0-25 PLASTIC BAG, INJECTION (ML) INJECTION CONTINUOUS
Status: DISCONTINUED | OUTPATIENT
Start: 2025-02-04 | End: 2025-02-05

## 2025-02-04 RX ORDER — OXYTOCIN 10 [USP'U]/ML
10 INJECTION, SOLUTION INTRAMUSCULAR; INTRAVENOUS ONCE AS NEEDED
Status: COMPLETED | OUTPATIENT
Start: 2025-02-04 | End: 2025-02-04

## 2025-02-04 RX ORDER — TERBUTALINE SULFATE 1 MG/ML
0.25 INJECTION SUBCUTANEOUS ONCE AS NEEDED
Status: DISCONTINUED | OUTPATIENT
Start: 2025-02-04 | End: 2025-02-04 | Stop reason: HOSPADM

## 2025-02-04 RX ADMIN — Medication 4 ML: at 18:26

## 2025-02-04 RX ADMIN — Medication 2 ML: at 18:29

## 2025-02-04 RX ADMIN — METHYLERGONOVINE MALEATE 0.2 MG: 0.2 INJECTION, SOLUTION INTRAMUSCULAR; INTRAVENOUS at 21:29

## 2025-02-04 RX ADMIN — ONDANSETRON 4 MG: 2 INJECTION INTRAMUSCULAR; INTRAVENOUS at 22:22

## 2025-02-04 RX ADMIN — SODIUM CHLORIDE, POTASSIUM CHLORIDE, SODIUM LACTATE AND CALCIUM CHLORIDE 75 ML/HR: 600; 310; 30; 20 INJECTION, SOLUTION INTRAVENOUS at 13:46

## 2025-02-04 RX ADMIN — Medication 5 ML: at 18:20

## 2025-02-04 RX ADMIN — OXYTOCIN 10 UNITS: 10 INJECTION INTRAVENOUS at 21:19

## 2025-02-04 RX ADMIN — Medication 3 ML: at 14:20

## 2025-02-04 RX ADMIN — SODIUM CHLORIDE, POTASSIUM CHLORIDE, SODIUM LACTATE AND CALCIUM CHLORIDE 500 ML: 600; 310; 30; 20 INJECTION, SOLUTION INTRAVENOUS at 13:46

## 2025-02-04 RX ADMIN — IBUPROFEN 600 MG: 600 TABLET, FILM COATED ORAL at 23:28

## 2025-02-04 RX ADMIN — ACETAMINOPHEN 975 MG: 325 TABLET ORAL at 23:28

## 2025-02-04 RX ADMIN — Medication 8 ML/HR: at 14:21

## 2025-02-04 SDOH — ECONOMIC STABILITY: FOOD INSECURITY: WITHIN THE PAST 12 MONTHS, YOU WORRIED THAT YOUR FOOD WOULD RUN OUT BEFORE YOU GOT THE MONEY TO BUY MORE.: NEVER TRUE

## 2025-02-04 SDOH — SOCIAL STABILITY: SOCIAL INSECURITY: ABUSE SCREEN: ADULT

## 2025-02-04 SDOH — SOCIAL STABILITY: SOCIAL INSECURITY: PHYSICAL ABUSE: DENIES

## 2025-02-04 SDOH — SOCIAL STABILITY: SOCIAL INSECURITY: HAVE YOU HAD THOUGHTS OF HARMING ANYONE ELSE?: NO

## 2025-02-04 SDOH — HEALTH STABILITY: MENTAL HEALTH: SUICIDAL BEHAVIOR (LIFETIME): NO

## 2025-02-04 SDOH — SOCIAL STABILITY: SOCIAL INSECURITY
WITHIN THE LAST YEAR, HAVE YOU BEEN RAPED OR FORCED TO HAVE ANY KIND OF SEXUAL ACTIVITY BY YOUR PARTNER OR EX-PARTNER?: NO

## 2025-02-04 SDOH — HEALTH STABILITY: MENTAL HEALTH: WISH TO BE DEAD (PAST 1 MONTH): NO

## 2025-02-04 SDOH — SOCIAL STABILITY: SOCIAL INSECURITY: ARE YOU OR HAVE YOU BEEN THREATENED OR ABUSED PHYSICALLY, EMOTIONALLY, OR SEXUALLY BY ANYONE?: NO

## 2025-02-04 SDOH — SOCIAL STABILITY: SOCIAL INSECURITY: HAVE YOU HAD ANY THOUGHTS OF HARMING ANYONE ELSE?: NO

## 2025-02-04 SDOH — SOCIAL STABILITY: SOCIAL INSECURITY: ARE THERE ANY APPARENT SIGNS OF INJURIES/BEHAVIORS THAT COULD BE RELATED TO ABUSE/NEGLECT?: NO

## 2025-02-04 SDOH — ECONOMIC STABILITY: FOOD INSECURITY: WITHIN THE PAST 12 MONTHS, THE FOOD YOU BOUGHT JUST DIDN'T LAST AND YOU DIDN'T HAVE MONEY TO GET MORE.: NEVER TRUE

## 2025-02-04 SDOH — SOCIAL STABILITY: SOCIAL INSECURITY
WITHIN THE LAST YEAR, HAVE YOU BEEN KICKED, HIT, SLAPPED, OR OTHERWISE PHYSICALLY HURT BY YOUR PARTNER OR EX-PARTNER?: NO

## 2025-02-04 SDOH — HEALTH STABILITY: MENTAL HEALTH: WERE YOU ABLE TO COMPLETE ALL THE BEHAVIORAL HEALTH SCREENINGS?: YES

## 2025-02-04 SDOH — SOCIAL STABILITY: SOCIAL INSECURITY: DOES ANYONE TRY TO KEEP YOU FROM HAVING/CONTACTING OTHER FRIENDS OR DOING THINGS OUTSIDE YOUR HOME?: NO

## 2025-02-04 SDOH — SOCIAL STABILITY: SOCIAL INSECURITY: VERBAL ABUSE: DENIES

## 2025-02-04 SDOH — SOCIAL STABILITY: SOCIAL INSECURITY: WITHIN THE LAST YEAR, HAVE YOU BEEN HUMILIATED OR EMOTIONALLY ABUSED IN OTHER WAYS BY YOUR PARTNER OR EX-PARTNER?: NO

## 2025-02-04 SDOH — SOCIAL STABILITY: SOCIAL INSECURITY: WITHIN THE LAST YEAR, HAVE YOU BEEN AFRAID OF YOUR PARTNER OR EX-PARTNER?: NO

## 2025-02-04 SDOH — ECONOMIC STABILITY: HOUSING INSECURITY: DO YOU FEEL UNSAFE GOING BACK TO THE PLACE WHERE YOU ARE LIVING?: NO

## 2025-02-04 SDOH — HEALTH STABILITY: MENTAL HEALTH: NON-SPECIFIC ACTIVE SUICIDAL THOUGHTS (PAST 1 MONTH): NO

## 2025-02-04 SDOH — SOCIAL STABILITY: SOCIAL INSECURITY: HAS ANYONE EVER THREATENED TO HURT YOUR FAMILY OR YOUR PETS?: NO

## 2025-02-04 SDOH — SOCIAL STABILITY: SOCIAL INSECURITY: DO YOU FEEL ANYONE HAS EXPLOITED OR TAKEN ADVANTAGE OF YOU FINANCIALLY OR OF YOUR PERSONAL PROPERTY?: NO

## 2025-02-04 ASSESSMENT — LIFESTYLE VARIABLES
AUDIT-C TOTAL SCORE: 0
SKIP TO QUESTIONS 9-10: 1
AUDIT-C TOTAL SCORE: 0
HOW OFTEN DO YOU HAVE 6 OR MORE DRINKS ON ONE OCCASION: NEVER
HOW OFTEN DO YOU HAVE A DRINK CONTAINING ALCOHOL: NEVER
HOW MANY STANDARD DRINKS CONTAINING ALCOHOL DO YOU HAVE ON A TYPICAL DAY: PATIENT DOES NOT DRINK

## 2025-02-04 ASSESSMENT — PAIN SCALES - GENERAL
PAINLEVEL_OUTOF10: 3
PAINLEVEL_OUTOF10: 3
PAINLEVEL_OUTOF10: 0 - NO PAIN

## 2025-02-04 ASSESSMENT — PATIENT HEALTH QUESTIONNAIRE - PHQ9
1. LITTLE INTEREST OR PLEASURE IN DOING THINGS: NOT AT ALL
SUM OF ALL RESPONSES TO PHQ9 QUESTIONS 1 & 2: 0
2. FEELING DOWN, DEPRESSED OR HOPELESS: NOT AT ALL

## 2025-02-04 ASSESSMENT — ACTIVITIES OF DAILY LIVING (ADL): LACK_OF_TRANSPORTATION: NO

## 2025-02-04 NOTE — H&P
OB Admission H&P    Assessment/Plan    Sarah Reynolds is a 24 y.o.  at 39w0d, JOSSY: 2025, by Ultrasound, who is admitted for Labor. At office, cervix was 590/-2.     Plan   -Admit to L&D, consented  -T&S, CBC, and Syphilis  -Epidural at patient request  -Recheck as clinically indicated by maternal or fetal status    Fetal Status  -NST reactive, reassuring   -Presentation cephalic based on ultrasound  -EFW 6-6.5 lbs by Leopold's, last growth US on  showed 2030 g at the 23%ile  -GBS negative    Postpartum  Contraception Plan: Nexplanon    9w3d Problems (from 24 to present)       Problem Noted Diagnosed Resolved    39 weeks gestation of pregnancy ([facility]) 2025 by Cynthia Ponce MD  No    Priority:  Medium       Iron deficiency anemia of pregnancy ([facility]) 2024 by ARTUR Ayon  No    Priority:  Medium       Overview Signed 2024  3:22 PM by ARTUR Ayon     Iron supplementation sent to pharmacy patient reports she is taking it.  Colace also provided prophylactically for constipation.         28 weeks gestation of pregnancy ([facility]) 10/10/2024 by ARTUR Ayon  No    Priority:  Medium       Overview Addendum 2024  3:21 PM by ARTUR Ayon     Desired provider in labor: [] CNM  [] Physician  [x] Blood Products: [x] Yes, accepts [] No, needs counseling  [x] Initial BMI: 25.23   [x] Prenatal Labs:   [] Cervical Cancer Screening up to date  [x] Rh status:  O+  [] Genetic Screening:    [] NT US: (11-13 wks)  [] Baby ASA (if indicated):  [] Pregnancy dated by:     [x] Anatomy US: (19-20 wks)  [] Federal Sterilization consent signed (if indicated):  [x] 1hr GCT at 24-28wks:   [] Rhogam (if indicated): N/A  [] Fetal Surveillance (if indicated):  [] Tdap (27-32 wks, may be given up to 36 wks if initial window missed):   [] RSV (32-36 wks) (Sept. to end ):   [] Flu Vaccine:    [] Breastfeeding:  [] Postpartum  Birth control method:   [] GBS at 36 - 37 wks:  [] 39 weeks discussion of IOL vs. Expectant management:  [] Mode of delivery ( anticipated ):     Patient to get 28-week labs today.  Will watch for results.  Patient with many concerns about feeling bigger than she did with her previous pregnancy.  Reassurance is provided.                 Subjective   Presented to outpatient prenatal visit at 39w0d reporting good fetal movement. Denies vaginal bleeding. Notets she feels like she is having contractions q 5 minutes. Denies leaking of fluid.      Prenatal Provider Dr. Sharma    OB History    Para Term  AB Living   3 1 1 0 1 1   SAB IAB Ectopic Multiple Live Births   0 1 0 0 1      # Outcome Date GA Lbr Oswaldo/2nd Weight Sex Type Anes PTL Lv   3 Current            2 IAB 24           1 Term 23 39w0d  2.778 kg M Vag-Spont EPI N LUDIN      Name: Troy       History reviewed. No pertinent surgical history.    Social History     Tobacco Use    Smoking status: Never    Smokeless tobacco: Never   Substance Use Topics    Alcohol use: Not Currently       No Known Allergies    Medications Prior to Admission   Medication Sig Dispense Refill Last Dose/Taking    clindamycin (Cleocin T) 1 % lotion Clindamycin Phosphate 1 % External Lotion   Quantity: 60  Refills: 0        Start : 23-Oct-2020   Active   Past Week    prenatal vit no.129-iron-folic (Prenatal One Daily) 27 mg iron- 800 mcg tablet Take 1 tablet by mouth once daily.   Past Week    docusate sodium (Colace) 100 mg capsule Take 1 capsule (100 mg) by mouth once daily. (Patient not taking: Reported on 2025) 90 capsule 2 More than a month    ferrous gluconate (Fergon) 324 (38 Fe) mg tablet Take 1 tablet (38 mg of iron) by mouth once daily with breakfast. (Patient not taking: Reported on 2025) 90 tablet 3 More than a month    fluticasone (Flonase) 50 mcg/actuation nasal spray Fluticasone Propionate 50 MCG/ACT Nasal Suspension   Quantity: 16  Refills: 0         Start : 1-Apr-2022   Active (Patient not taking: Reported on 2/4/2025)   More than a month     Objective     Last Vitals  Temp Pulse Resp BP MAP O2 Sat   36.6 °C (97.9 °F) 90 15 116/70 87 100 %     Blood Pressures         2/4/2025  1426 2/4/2025  1428 2/4/2025  1430 2/4/2025  1432 2/4/2025  1529    BP: 106/60 109/62 104/61 114/67 116/70             Physical Exam  General: NAD, mood appropriate  Cardiopulmonary: warm and well perfused, breathing comfortably on room air  Abdomen: Gravid, non-tender  Extremities: Symmetric  Speculum Exam: deferred  Cervix: 5 /90 /-2      Fetal Monitoring  Baseline: 150 bpm, Variability: moderate,  Accelerations: present and Decelerations: none  Uterine Activity: q5 minutes  Interpretation: Reactive    Bedside ultrasound: Yes presentation cephalic    Labs in chart were reviewed.   Results from last 7 days   Lab Units 02/04/25  1306   WBC AUTO x10*3/uL 12.7*   HEMOGLOBIN g/dL 10.3*   HEMATOCRIT % 32.3*   PLATELETS AUTO x10*3/uL 182        Prenatal labs reviewed, not remarkable.

## 2025-02-04 NOTE — ANESTHESIA PROCEDURE NOTES
Epidural Block    Patient location during procedure: OB  Start time: 2/4/2025 1:49 PM  End time: 2/4/2025 2:20 PM  Reason for block: labor analgesia  Staffing  Performed: AVILA and attending   Authorized by: Beatriz Montero MD    Performed by: AVILA Biggs    Preanesthetic Checklist  Completed: patient identified, IV checked, risks and benefits discussed, surgical consent, pre-op evaluation, timeout performed and sterile techniques followed  Block Timeout  RN/Licensed healthcare professional reads aloud to the Anesthesia provider and entire team: Patient identity, procedure with side and site, patient position, and as applicable the availability of implants/special equipment/special requirements.  Patient on coagulant treatment: no  Timeout performed at: 2/4/2025 1:49 PM  Block Placement  Patient position: sitting  Prep: ChloraPrep  Sterility prep: cap, gloves and mask  Sedation level: no sedation  Patient monitoring: blood pressure and continuous pulse oximetry  Approach: midline  Local numbing: lidocaine 1% to skin and subcutaneous tissues  Vertebral space: lumbar  Lumbar location: L3-L4  Epidural  Loss of resistance technique: saline  Guidance: landmark technique        Needle  Needle type: Tuohy   Needle gauge: 17  Needle length: 8.9cm  Needle insertion depth: 5.5 cm  Catheter type: multi-orifice  Catheter size: 19 G  Catheter at skin depth: 10.5 cm  Catheter securement method: clear occlusive dressing and liquid medical adhesive    Test dose: lidocaine 1.5% with epinephrine 1-to-200,000  Test dose: lidocaine 1.5% with epinephrine 1-to-200,000  Test dose result: no positive test dose    PCEA  Medication concentration used: 0.2% Ropivacaine with 2 mcg/mL Fentanyl  Dose (mL): 5  Lockout (minutes): 30  1-Hour Limit (boluses/hr): 2  Basal Rate: 8        Assessment  Sensory level: other (R T8 and L T10) bilateral  Block outcome: patient comfortable  Number of attempts: 3 or more  Events: no positive test  dose  Procedure assessment: patient tolerated procedure well with no immediate complications  Additional Notes  Three attempts ( 1 MIHAI, 1 CAA, 1 attending). Two levels attempted.

## 2025-02-04 NOTE — PROGRESS NOTES
Prenatal visit at 39 weeks and 0 days    Patient presented to office today feeling regular contractions about every 5 minutes.  Denies leaking fluid or bleeding.  Good fetal movement.    Prenatal problem list:     x1  Rr cfDNA neg, XX  Anatomy nml  Fe supp  Glucola nml  Peds [facility]  Growth US nml 23%ile  Tdap given 2025  GBS neg  DECLINED FLU VACC-25  39 wk IOL 2/5 5pm    /81   Wt 66.5 kg (146 lb 8 oz)   LMP  (LMP Unknown)   BMI 29.59 kg/m²     Cervix 5 cm 90% -2 station    Assessment and plan: Suspect labor, patient and partner will be going to labor and delivery after office.  Informed labor and delivery that patient will be coming.

## 2025-02-04 NOTE — ANESTHESIA PREPROCEDURE EVALUATION
Patient: Sarah Reynolds    Evaluation Method: In-person visit    Procedure Information    Date: 02/04/25  Procedure: Labor Analgesia         Relevant Problems   Anesthesia (within normal limits)      Cardiac (within normal limits)      Neuro   (+) Depression affecting pregnancy ([facility])      /Renal (within normal limits)      Liver (within normal limits)      Endocrine (within normal limits)      Hematology   (+) Iron deficiency anemia of pregnancy ([facility])      GYN   (+) 28 weeks gestation of pregnancy ([facility])   (+) 39 weeks gestation of pregnancy ([facility])       Clinical information reviewed:   Tobacco  Allergies  Meds   Med Hx  Surg Hx   Fam Hx  Soc Hx        NPO Detail:  No data recorded     OB/Gyn Evaluation    Present Pregnancy    Patient is pregnant now.  (+) , multiple gestation   Obstetric History            Physical Exam    Airway  Mallampati: II     Cardiovascular    Dental        Pulmonary    Abdominal        Anesthesia Plan    History of general anesthesia?: yes  History of complications of general anesthesia?: no    ASA 2     epidural     Anesthetic plan and risks discussed with patient.  Use of blood products discussed with patient who consented to blood products.    Plan discussed with CAA and attending.

## 2025-02-04 NOTE — SIGNIFICANT EVENT
"Labor Progress Note    Subjective: Patient feeling comfortable since epidural placement    Objective:  Vitals: /76   Pulse 78   Temp 36.4 °C (97.5 °F) (Temporal)   Resp 14   Ht 1.499 m (4' 11\")   Wt 65.5 kg (144 lb 8.2 oz)   LMP  (LMP Unknown)   SpO2 100%   BMI 29.19 kg/m²   SVE: 5/90/-2  FHT: 125/mod/+accel/-decel  [place]: ctx q5-9 min    A/P:  S/p AROM, will consider addition of pitocin for augmentation if needed  CEFM, currently Category I  Epidural infusing    Nata Garcia MD PGY-3  OB/GYN    "

## 2025-02-05 PROCEDURE — 11981 INSERTION DRUG DLVR IMPLANT: CPT

## 2025-02-05 PROCEDURE — 0JHF3HZ INSERTION OF CONTRACEPTIVE DEVICE INTO LEFT UPPER ARM SUBCUTANEOUS TISSUE AND FASCIA, PERCUTANEOUS APPROACH: ICD-10-PCS | Performed by: OBSTETRICS & GYNECOLOGY

## 2025-02-05 PROCEDURE — RXMED WILLOW AMBULATORY MEDICATION CHARGE

## 2025-02-05 PROCEDURE — 1210000001 HC SEMI-PRIVATE ROOM DAILY

## 2025-02-05 PROCEDURE — 2500000004 HC RX 250 GENERAL PHARMACY W/ HCPCS (ALT 636 FOR OP/ED): Mod: SE

## 2025-02-05 PROCEDURE — 2500000001 HC RX 250 WO HCPCS SELF ADMINISTERED DRUGS (ALT 637 FOR MEDICARE OP): Mod: SE | Performed by: STUDENT IN AN ORGANIZED HEALTH CARE EDUCATION/TRAINING PROGRAM

## 2025-02-05 PROCEDURE — 2500000005 HC RX 250 GENERAL PHARMACY W/O HCPCS: Mod: SE | Performed by: STUDENT IN AN ORGANIZED HEALTH CARE EDUCATION/TRAINING PROGRAM

## 2025-02-05 RX ORDER — ADHESIVE BANDAGE
10 BANDAGE TOPICAL
Status: DISCONTINUED | OUTPATIENT
Start: 2025-02-05 | End: 2025-02-06 | Stop reason: HOSPADM

## 2025-02-05 RX ORDER — LABETALOL HYDROCHLORIDE 5 MG/ML
20 INJECTION, SOLUTION INTRAVENOUS ONCE AS NEEDED
Status: DISCONTINUED | OUTPATIENT
Start: 2025-02-05 | End: 2025-02-06 | Stop reason: HOSPADM

## 2025-02-05 RX ORDER — ACETAMINOPHEN 325 MG/1
975 TABLET ORAL ONCE
Status: DISCONTINUED | OUTPATIENT
Start: 2025-02-05 | End: 2025-02-06 | Stop reason: HOSPADM

## 2025-02-05 RX ORDER — OXYTOCIN/0.9 % SODIUM CHLORIDE 30/500 ML
60 PLASTIC BAG, INJECTION (ML) INTRAVENOUS ONCE AS NEEDED
Status: DISCONTINUED | OUTPATIENT
Start: 2025-02-05 | End: 2025-02-06 | Stop reason: HOSPADM

## 2025-02-05 RX ORDER — POLYETHYLENE GLYCOL 3350 17 G/17G
17 POWDER, FOR SOLUTION ORAL 2 TIMES DAILY PRN
Status: DISCONTINUED | OUTPATIENT
Start: 2025-02-05 | End: 2025-02-06 | Stop reason: HOSPADM

## 2025-02-05 RX ORDER — HYDRALAZINE HYDROCHLORIDE 20 MG/ML
5 INJECTION INTRAMUSCULAR; INTRAVENOUS ONCE AS NEEDED
Status: DISCONTINUED | OUTPATIENT
Start: 2025-02-05 | End: 2025-02-06 | Stop reason: HOSPADM

## 2025-02-05 RX ORDER — NIFEDIPINE 10 MG/1
10 CAPSULE ORAL ONCE AS NEEDED
Status: DISCONTINUED | OUTPATIENT
Start: 2025-02-05 | End: 2025-02-06 | Stop reason: HOSPADM

## 2025-02-05 RX ORDER — OXYTOCIN 10 [USP'U]/ML
10 INJECTION, SOLUTION INTRAMUSCULAR; INTRAVENOUS ONCE AS NEEDED
Status: DISCONTINUED | OUTPATIENT
Start: 2025-02-05 | End: 2025-02-06 | Stop reason: HOSPADM

## 2025-02-05 RX ORDER — DIPHENHYDRAMINE HYDROCHLORIDE 50 MG/ML
25 INJECTION INTRAMUSCULAR; INTRAVENOUS EVERY 6 HOURS PRN
Status: DISCONTINUED | OUTPATIENT
Start: 2025-02-05 | End: 2025-02-06 | Stop reason: HOSPADM

## 2025-02-05 RX ORDER — ACETAMINOPHEN 325 MG/1
975 TABLET ORAL EVERY 6 HOURS PRN
Qty: 120 TABLET | Refills: 0 | Status: SHIPPED | OUTPATIENT
Start: 2025-02-05

## 2025-02-05 RX ORDER — MISOPROSTOL 200 UG/1
800 TABLET ORAL ONCE AS NEEDED
Status: DISCONTINUED | OUTPATIENT
Start: 2025-02-05 | End: 2025-02-06 | Stop reason: HOSPADM

## 2025-02-05 RX ORDER — LOPERAMIDE HYDROCHLORIDE 2 MG/1
4 CAPSULE ORAL EVERY 2 HOUR PRN
Status: DISCONTINUED | OUTPATIENT
Start: 2025-02-05 | End: 2025-02-06 | Stop reason: HOSPADM

## 2025-02-05 RX ORDER — DIPHENHYDRAMINE HCL 25 MG
25 CAPSULE ORAL EVERY 6 HOURS PRN
Status: DISCONTINUED | OUTPATIENT
Start: 2025-02-05 | End: 2025-02-06 | Stop reason: HOSPADM

## 2025-02-05 RX ORDER — IBUPROFEN 600 MG/1
600 TABLET ORAL EVERY 6 HOURS PRN
Qty: 60 TABLET | Refills: 0 | Status: SHIPPED | OUTPATIENT
Start: 2025-02-05

## 2025-02-05 RX ORDER — ONDANSETRON HYDROCHLORIDE 2 MG/ML
4 INJECTION, SOLUTION INTRAVENOUS EVERY 6 HOURS PRN
Status: DISCONTINUED | OUTPATIENT
Start: 2025-02-05 | End: 2025-02-06 | Stop reason: HOSPADM

## 2025-02-05 RX ORDER — METHYLERGONOVINE MALEATE 0.2 MG/ML
0.2 INJECTION INTRAVENOUS ONCE AS NEEDED
Status: DISCONTINUED | OUTPATIENT
Start: 2025-02-05 | End: 2025-02-06 | Stop reason: HOSPADM

## 2025-02-05 RX ORDER — ONDANSETRON 4 MG/1
4 TABLET, FILM COATED ORAL EVERY 6 HOURS PRN
Status: DISCONTINUED | OUTPATIENT
Start: 2025-02-05 | End: 2025-02-06 | Stop reason: HOSPADM

## 2025-02-05 RX ORDER — LIDOCAINE HYDROCHLORIDE 10 MG/ML
5 INJECTION, SOLUTION EPIDURAL; INFILTRATION; INTRACAUDAL; PERINEURAL ONCE AS NEEDED
Status: DISCONTINUED | OUTPATIENT
Start: 2025-02-05 | End: 2025-02-06 | Stop reason: HOSPADM

## 2025-02-05 RX ORDER — CARBOPROST TROMETHAMINE 250 UG/ML
250 INJECTION, SOLUTION INTRAMUSCULAR ONCE AS NEEDED
Status: DISCONTINUED | OUTPATIENT
Start: 2025-02-05 | End: 2025-02-06 | Stop reason: HOSPADM

## 2025-02-05 RX ORDER — LIDOCAINE HYDROCHLORIDE 10 MG/ML
INJECTION, SOLUTION INFILTRATION; PERINEURAL
Status: COMPLETED
Start: 2025-02-05 | End: 2025-02-05

## 2025-02-05 RX ORDER — IBUPROFEN 600 MG/1
600 TABLET ORAL ONCE
Status: DISCONTINUED | OUTPATIENT
Start: 2025-02-05 | End: 2025-02-06 | Stop reason: HOSPADM

## 2025-02-05 RX ORDER — SIMETHICONE 80 MG
80 TABLET,CHEWABLE ORAL 4 TIMES DAILY PRN
Status: DISCONTINUED | OUTPATIENT
Start: 2025-02-05 | End: 2025-02-06 | Stop reason: HOSPADM

## 2025-02-05 RX ORDER — TRANEXAMIC ACID 100 MG/ML
1000 INJECTION, SOLUTION INTRAVENOUS ONCE AS NEEDED
Status: DISCONTINUED | OUTPATIENT
Start: 2025-02-05 | End: 2025-02-06 | Stop reason: HOSPADM

## 2025-02-05 RX ADMIN — ACETAMINOPHEN 975 MG: 325 TABLET ORAL at 05:22

## 2025-02-05 RX ADMIN — LIDOCAINE HYDROCHLORIDE: 10 INJECTION, SOLUTION INFILTRATION; PERINEURAL at 14:06

## 2025-02-05 RX ADMIN — BENZOCAINE AND LEVOMENTHOL 1 APPLICATION: 200; 5 SPRAY TOPICAL at 05:23

## 2025-02-05 RX ADMIN — IBUPROFEN 600 MG: 600 TABLET, FILM COATED ORAL at 05:21

## 2025-02-05 RX ADMIN — IBUPROFEN 600 MG: 600 TABLET, FILM COATED ORAL at 23:28

## 2025-02-05 RX ADMIN — ACETAMINOPHEN 975 MG: 325 TABLET ORAL at 23:29

## 2025-02-05 RX ADMIN — ACETAMINOPHEN 975 MG: 325 TABLET ORAL at 11:29

## 2025-02-05 RX ADMIN — IBUPROFEN 600 MG: 600 TABLET, FILM COATED ORAL at 11:29

## 2025-02-05 RX ADMIN — WITCH HAZEL 1 EACH: 500 SOLUTION RECTAL; TOPICAL at 05:22

## 2025-02-05 RX ADMIN — ACETAMINOPHEN 975 MG: 325 TABLET ORAL at 17:29

## 2025-02-05 RX ADMIN — IBUPROFEN 600 MG: 600 TABLET, FILM COATED ORAL at 17:29

## 2025-02-05 ASSESSMENT — PAIN SCALES - GENERAL
PAINLEVEL_OUTOF10: 0 - NO PAIN
PAINLEVEL_OUTOF10: 2
PAINLEVEL_OUTOF10: 0 - NO PAIN
PAINLEVEL_OUTOF10: 5 - MODERATE PAIN
PAIN_LEVEL: 1

## 2025-02-05 ASSESSMENT — PAIN DESCRIPTION - DESCRIPTORS: DESCRIPTORS: CRAMPING

## 2025-02-05 ASSESSMENT — PAIN DESCRIPTION - LOCATION: LOCATION: ABDOMEN

## 2025-02-05 NOTE — PROCEDURES
General    Date/Time: 2/5/2025 1:52 PM    Performed by: ARTUR Hewitt  Authorized by: ARTUR Hewitt    Consent:     Consent obtained:  Verbal and written    Consent given by:  Patient    Risks, benefits, and alternatives were discussed: yes      Risks discussed:  Bleeding, infection, nerve damage, pain and poor cosmetic result  Universal protocol:     Procedure explained and questions answered to patient or proxy's satisfaction: yes      Relevant documents present and verified: yes      Required blood products, implants, devices, and special equipment available: yes      Site/side marked: yes      Immediately prior to procedure, a time out was called: yes      Patient identity confirmed:  Verbally with patient  Indications:     Indications:  Nexplanon insertion  Pre-procedure details:     Skin preparation:  Povidone-iodine  Sedation:     Sedation type:  None  Anesthesia:     Anesthesia method:  Local infiltration    Local anesthetic:  Lidocaine 1% w/o epi  Procedure specific details:      Nexplanon placement    Risks, benefits and alternatives were discussed with the patient. We discussed possible complications and risks, including infection, bleeding, pain, tingling, failure, migration of implant, increased risk of ectopic should pregnancy occur and inability to remove implant. Written consent was obtained prior to the procedure and is detailed in the patient's record.     Procedure Note: Patient placed in supine position. 2 ml of 1% lidocaine was injected just under the skin at marked insertion site.  The skin was prepped with betadine.   Using standard technique, the implant was inserted in the inner side of the patient's non-dominant (left) upper arm. Insertion was confirmed by visual inspection of the tip of the needle and palpation of the patient's arm.   Patient Status: the patient tolerated the procedure well.   Complications: there were no complications. No blood loss.    Post  insertion precautions/expectations were discussed with the patient.   Post-procedure details:     Procedure completion:  Tolerated well, no immediate complications

## 2025-02-05 NOTE — CARE PLAN
The patient's goals for the shift include      The clinical goals for the shift include Safe induction start    Problem: Vaginal Birth or  Section  Goal: Fetal and maternal status remain reassuring during the birth process  Outcome: Met  Goal: Prevention of malpresentation/labor dystocia through delivery  Outcome: Met  Goal: Demonstrates labor coping techniques through delivery  Outcome: Met     Problem: Vaginal Birth or  Section  Goal: Minimal s/sx of HDP and BP<160/110  2025 by Beatriz Doshi RN  Outcome: Progressing  2025 by Beatriz Doshi RN  Outcome: Progressing  Goal: No s/sx of infection through recovery  2025 by Beatriz Doshi RN  Outcome: Progressing  2025 by Beatriz Doshi RN  Outcome: Progressing  Goal: No s/sx of hemorrhage through recovery  2025 by Beatriz Doshi RN  Outcome: Progressing  2025 by Beatriz Doshi RN  Outcome: Progressing

## 2025-02-05 NOTE — DISCHARGE INSTRUCTIONS

## 2025-02-05 NOTE — PROGRESS NOTES
Postpartum Progress Note    Assessment/Plan   Sarah Reynolds is a 24 y.o., , who delivered at 39w0d gestation via Vaginal, Spontaneous    Now PPD#1 s/p Vaginal, Spontaneous on 2025  - Continue routine postpartum care  - Pain well controlled on po medications  - DVT risk score DVT Score (IF A SCORE IS NOT CALCULATING, MUST SELECT A BMI TO COMPLETE): 3 , ppx with SCDs and ambulation  - RH positive, rhogam not indicated  - Hgb:   Results from last 7 days   Lab Units 25  1306   HEMOGLOBIN g/dL 10.3*      VALENTIN  - Admission hgb 10.3  -  mL  - VS WNL, asymptomatic    Maternal Well-Being  - Vitals stable  - All questions and concerns address     Feeding  - Breastfeeding/pumping encouraged  - Lactation consult prn    Contraception  - Nexplanon  - Education provided    Dispo  - Anticipate d/c on PPD #2 if meeting all postpartum milestones  - Follow-up in 4-6wks with primary ARTUR Burnett     Assessment & Plan  39 weeks gestation of pregnancy ([facility])    Anemia of mother in pregnancy, delivered with postpartum condition ([facility])    Vaginal delivery ([facility])    9w3d Problems (from 24 to present)       Problem Noted Diagnosed Resolved    39 weeks gestation of pregnancy ([facility]) 2025 by Cynthia Ponce MD  No    Priority:  Medium       Iron deficiency anemia of pregnancy ([facility]) 2024 by ARTUR Ayon  No    Priority:  Medium       Overview Signed 2024  3:22 PM by ARTUR Ayon     Iron supplementation sent to pharmacy patient reports she is taking it.  Colace also provided prophylactically for constipation.         28 weeks gestation of pregnancy ([facility]) 10/10/2024 by ARTUR Ayon  No    Priority:  Medium       Overview Addendum 2024  3:21 PM by ARTUR Ayon     Desired provider in labor: [] CNM  [] Physician  [x] Blood Products: [x] Yes, accepts [] No, needs counseling  [x]  Initial BMI: 25.23   [x] Prenatal Labs:   [] Cervical Cancer Screening up to date  [x] Rh status:  O+  [] Genetic Screening:    [] NT US: (11-13 wks)  [] Baby ASA (if indicated):  [] Pregnancy dated by:     [x] Anatomy US: (19-20 wks)  [] Federal Sterilization consent signed (if indicated):  [x] 1hr GCT at 24-28wks:   [] Rhogam (if indicated): N/A  [] Fetal Surveillance (if indicated):  [] Tdap (27-32 wks, may be given up to 36 wks if initial window missed):   [] RSV (32-36 wks) (Sept. to end of Jan):   [] Flu Vaccine:    [] Breastfeeding:  [] Postpartum Birth control method:   [] GBS at 36 - 37 wks:  [] 39 weeks discussion of IOL vs. Expectant management:  [] Mode of delivery ( anticipated ):     Patient to get 28-week labs today.  Will watch for results.  Patient with many concerns about feeling bigger than she did with her previous pregnancy.  Reassurance is provided.                 Subjective     Brana Rajni Reynolds is PPD#1 s/p vaginal delivery who reports feeling overall well.    Her pain is well controlled with current medications  She is passing flatus  She is ambulating well  She is tolerating a Adult diet Regular  She reports no breast or nursing problems  She denies emotional concerns today      Denies dizziness/lightheadedness, SOB, palpitations, extreme fatigue, heavy bleeding     Objective   Allergies:   Patient has no known allergies.         Last Vitals:  Temp Pulse Resp BP MAP Pulse Ox   36.3 °C (97.3 °F) 70 14 115/78   98 %     Vitals Min/Max Last 24 Hours:  Temp  Min: 36.2 °C (97.2 °F)  Max: 37.3 °C (99.1 °F)  Pulse  Min: 68  Max: 132  Resp  Min: 14  Max: 18  BP  Min: 97/53  Max: 148/86    Intake/Output:     Intake/Output Summary (Last 24 hours) at 2/5/2025 1150  Last data filed at 2/4/2025 2246  Gross per 24 hour   Intake 3 ml   Output 498 ml   Net -495 ml       Physical Exam:  General: Examination reveals a well developed, well nourished, female, in no acute distress. She is alert and  cooperative.  Lungs: symmetrical, non-labored breathing.  Cardiac: warm, well-perfused.  Abdomen: soft, non-tender.  Fundus: firm, below umbilicus, and nontender.  Extremities: no redness or tenderness in the calves or thighs.  Neurological: alert, oriented, normal speech, no focal findings or movement disorder noted.     Lab Data:  Labs in chart were reviewed.

## 2025-02-05 NOTE — L&D DELIVERY NOTE
OB Delivery Note  2025  Sarah Reynolds  24 y.o.   Vaginal, Spontaneous     Normal Spontaneous Vaginal Delivery    Delivery Provider: Alma    Resident/Fellow/Other Assistant: Kwesi Greer    Description of Procedure:  Delivery of viable infant under epidural anesthesia. Delayed clamping was performed. The infant was placed skin to skin.. Cord gases were not sent.  Cord blood was collected. Placenta delivered intact and fundus was firm following uterotonics, including standard IM pitocin and additional methergine. Several clots removed with manual passes and fundal massage; uterus firm without bleeding thereafter.    No laceration identified.       Gestational Age: 39w0d  /Para:   Quantitative Blood Loss: Admission to Discharge: 200 mL (2025 11:58 AM - 2025  9:58 PM)    Mei Reynolds [[id number]]      Labor Events    Sac identifier: Sac 1  Fluid color: Clear  Labor type: Spontaneous Onset of Labor  Labor allowed to proceed with plans for an attempted vaginal birth?: Yes  Augmentation: AROM  Augmentation date/time: 2025 1632  Augmentation indications: Ineffective Contraction Pattern  Complications: Uterine Atony       Labor Event Times    Labor onset date/time: 2025 1200  Dilation complete date/time: 2025  Start pushing date/time: 2025       Placenta    Placenta delivery date/time: 2025  Placenta removal: Spontaneous  Placenta appearance: Intact  Placenta disposition: discarded       Cord    Vessels: 3 vessels  Complications: None  Delayed cord clamping?: Yes  Cord clamped date/time: 2025 21:20:00  Cord blood disposition: Lab, Discarded  Gases sent?: No       Lacerations    Episiotomy: None  Perineal laceration: None  Other lacerations?: No  Repair suture: None       Anesthesia    Method: Epidural       Operative Delivery    Forceps attempted?: No  Vacuum extractor attempted?: No       Shoulder Dystocia    Shoulder dystocia  present?: No       [place] Delivery    Time head delivered: 2025 21:17:54  Birth date/time: 2025 21:18:00  Delivery type: Vaginal, Spontaneous  Complications: Uterine Atony       Resuscitation    Method: Suctioning, Tactile stimulation       Apgars    Living status: Living  Apgar Component Scores:  1 min.:  5 min.:  10 min.:  15 min.:  20 min.:    Skin color:  0  1       Heart rate:  2  2       Reflex irritability:  2  2       Muscle tone:  2  2       Respiratory effort:  2  2       Total:  8  9       Apgars assigned by: ODAR RN       Delivery Providers    Delivering clinician: Armida Marquez MD   Provider Role    Beatriz Doshi RN Delivery Nurse    Lavern Martines RN Nursery Nurse    Silvia Orosco MD MPH Resident    Candice Greer MD Resident                 Silvia Orosco MD MPH

## 2025-02-05 NOTE — PROGRESS NOTES
Social Work Note    Patient: Sarah Reynolds    SW received referral to meet with Ms Reynolds for assessment due to history of depression per chart. She was accepting and engaged. Ms Reynolds reports she lives in a stable and appropriate home with FOB Estefany and their children 5yo Ravi, 1yo, Jayrin, and  Jaylayah. Ms Reynolds reports she has needed items for  including safe sleep and car seat. Safe sleep reviewed. Ms Reynolds also reports she receives food stamps and medical benefits, needs to scheduled WIC. She denies food/transportation/financial/resource concerns at this time and reports good support from FOB and their families. Ms Reynolds denies depression, states her mood is positive and stable. SW reviewed postpartum depression signs, symptoms, and resources and Ms Reynolds indicated understanding. No concerns or needs noted. Ms Reynolds and  clear from SW perspective.      KING Medina

## 2025-02-05 NOTE — ANESTHESIA POSTPROCEDURE EVALUATION
Patient: Sarah Reynolds    Procedure Summary       Date: 25 Room / Location:     Anesthesia Start: 1349 Anesthesia Stop:     Procedure: Labor Analgesia Diagnosis:     Scheduled Providers:  Responsible Provider: Shawanda Bearden MD    Anesthesia Type: epidural ASA Status: 2            Anesthesia Type: epidural      Vitals:    25 0359   BP: 123/74   Pulse: 90   Resp: 18   Temp: 36.6 °C (97.9 °F)   SpO2: 96%        Anesthesia Post Evaluation    Patient location during evaluation: bedside  Patient participation: complete - patient participated  Level of consciousness: awake  Pain score: 1  Pain management: adequate  Airway patency: patent  Cardiovascular status: acceptable  Respiratory status: acceptable  Hydration status: acceptable  Postoperative Nausea and Vomiting: none        No notable events documented.    Sarah Reynolds is a 24 y.o., , who had a Vaginal, Spontaneous delivery on 2025 at 39w0d and is now POD1.    She had Neuraxial Anesthesia without immediate complications noted.       Pain well controlled    Vitals:    25 0359   BP: 123/74   Pulse: 90   Resp: 18   Temp: 36.6 °C (97.9 °F)   SpO2: 96%       Neuraxial site assessed. No visible redness or swelling or drainage. Patient able to ambulate and move all extremities without difficulty. Able to void. No complaints of nausea/vomiting. Tolerating PO intake well. No s/sx of PDPH.     Anesthesia will sign off     Nica Ken MD

## 2025-02-06 ENCOUNTER — PHARMACY VISIT (OUTPATIENT)
Dept: PHARMACY | Facility: CLINIC | Age: 25
End: 2025-02-06
Payer: MEDICAID

## 2025-02-06 VITALS
HEART RATE: 82 BPM | OXYGEN SATURATION: 96 % | SYSTOLIC BLOOD PRESSURE: 119 MMHG | DIASTOLIC BLOOD PRESSURE: 77 MMHG | BODY MASS INDEX: 29.13 KG/M2 | WEIGHT: 144.51 LBS | TEMPERATURE: 97.7 F | RESPIRATION RATE: 18 BRPM | HEIGHT: 59 IN

## 2025-02-06 PROCEDURE — 2500000001 HC RX 250 WO HCPCS SELF ADMINISTERED DRUGS (ALT 637 FOR MEDICARE OP): Mod: SE | Performed by: STUDENT IN AN ORGANIZED HEALTH CARE EDUCATION/TRAINING PROGRAM

## 2025-02-06 RX ADMIN — IBUPROFEN 600 MG: 600 TABLET, FILM COATED ORAL at 14:01

## 2025-02-06 RX ADMIN — ACETAMINOPHEN 975 MG: 325 TABLET ORAL at 14:01

## 2025-02-06 RX ADMIN — ACETAMINOPHEN 975 MG: 325 TABLET ORAL at 08:36

## 2025-02-06 RX ADMIN — IBUPROFEN 600 MG: 600 TABLET, FILM COATED ORAL at 08:36

## 2025-02-06 ASSESSMENT — PAIN SCALES - GENERAL
PAINLEVEL_OUTOF10: 2
PAINLEVEL_OUTOF10: 0 - NO PAIN

## 2025-02-06 ASSESSMENT — PAIN DESCRIPTION - DESCRIPTORS: DESCRIPTORS: CRAMPING

## 2025-02-06 NOTE — LACTATION NOTE
Lactation Consultant Note  Lactation Consultation  Consultant Name: Will RN IBCLC    Maternal Information  Has mother  before?: Yes  How long did the mother previously breastfeed?: 3 months with her now 2 Year old, said she latcherd as well as pumped and gave expressesed breast milk and formula via bottles, stopped as she went back to work  Infant to breast within first 2 hours of birth?: No  Exclusive Pump and Bottle Feed: Yes    Maternal Assessment  Breast Assessment: Medium, Symmetrical  Nipple Assessment: Erect, Intact, Piercing present  [place] Assessment: Normal    Infant Assessment  Infant Behavior:  (deferred)    Feeding Assessment  Nutrition Source: Formula (per mother’s request)  Feeding Method: Paced bottle  Unable to assess infant feeding at this time:  (mother not interested in latching infant for feedings)    LATCH TOOL       Breast Pump       Other OB Lactation Tools       Patient Follow-up  Outpatient Lactation Follow-up: Recommended    Other OB Lactation Documentation       Recommendations/Summary  Mother has been feeding infant formula, she verbalized that she does not plan to latch baby to the breast but would like to pump and provide expressed breast milk via bottles. Mother has not started pumping yet, and plans to hopefully go home today. I explained to mother typical milk production patterns and that early and frequent breast stimulation helps to promote the production of a full milk supply. I recommended mother begin pumping as soon as she is able, and to pump every 3 hours with a goal of 8-12 pumping sessions in a 24 hour period. I recommended mother remove her nipple piercings while pumping in order to be able to use the correct size flange and maintain an adequate seal. I measured mother's nipples at 19mm and recommended she use size 20, 21 or 22mm flanges with her home pump. She has a pump from her last pregnancy, I offered to order one through her insurance, mother agreeable.  I will order her a medela pump via [place] per her request. I answered all questions and encouraged her to call for assistance if needed. I provided her with outpatient lactation resources.     If mother does not go home today I asked her to call for lactation and we can get her started pumping with the symphony pump.

## 2025-02-06 NOTE — DISCHARGE SUMMARY
Discharge Summary    Admission Date: 2025  Discharge Date: 25  Discharge Diagnosis: 39 weeks gestation of pregnancy ([facility])     Patient Active Problem List   Diagnosis    Depression affecting pregnancy ([facility])    Acute nonintractable headache    28 weeks gestation of pregnancy ([facility])    Iron deficiency anemia of pregnancy ([facility])    39 weeks gestation of pregnancy ([facility])    Anemia of mother in pregnancy, delivered with postpartum condition ([facility])    Vaginal delivery ([facility])     Hospital Course  Sarah Reynolds is a 24 y.o.,     Initially presented for: labor    Admission Date: 2025    Delivery Date: 2025 9:18 PM    Delivery type: Vaginal, Spontaneous     GA at delivery: 39w0d    Outcome: Living    Anesthesia during delivery: Epidural    Intrapartum complications: Uterine Atony    Feeding method: Breastfeeding Status: No    Contraception: Nexplanon    Rhogam: The patient's blood type is O POS. The baby's blood type is O POS. Rhogam is not indicated.     Now postpartum day: 2.    Hospital course n/f:      Depression  - no medications  - denies emotional concerns or need for medication at this time  - s/p SW consult    Iron deficiency anemia  - HMG 10.3 >   - asx  - to continue PO Fe at discharge    PP course otherwise uneventful.  Meeting all postpartum milestones- ambulating independently, passing flatus, tolerating PO intake, lochia light, voiding spontaneously, and pain well controlled with PO meds.       Dispo  OK for DC today    - Follow up with primary OB in:       - 4-6 weeks for post-partum visit       Pertinent Physical Exam At Time of Discharge  General: well appearing, well nourished, postpartum  Obstetric: fundus firm below umbilicus, lochia light  Skin: Warm, dry; no rashes/lesions/erythema  Neuro: A/Ox3, conversational, no gross motor deficit   GI: no distension, appropriately tender, soft  Respiratory: Even and unlabored on RA  Cardiovascular:  Trace BLE edema; No erythema, warmth  Psych: appropriate mood and affect         Your medication list        START taking these medications        Instructions Last Dose Given Next Dose Due   acetaminophen 325 mg tablet  Commonly known as: Tylenol      Take 3 tablets (975 mg) by mouth every 6 hours if needed for mild pain (1 - 3) or moderate pain (4 - 6).        mg tablet  Generic drug: ibuprofen      Take 1 tablet (600 mg) by mouth every 6 hours if needed for mild pain (1 - 3) or moderate pain (4 - 6).              CONTINUE taking these medications        Instructions Last Dose Given Next Dose Due   docusate sodium 100 mg capsule  Commonly known as: Colace      Take 1 capsule (100 mg) by mouth once daily.       Prenatal One Daily 27 mg iron- 800 mcg tablet  Generic drug: prenatal vit no.129-iron-folic                  STOP taking these medications      clindamycin 1 % lotion  Commonly known as: Cleocin T               ASK your doctor about these medications        Instructions Last Dose Given Next Dose Due   ferrous gluconate 324 (38 Fe) mg tablet  Commonly known as: Fergon      Take 1 tablet (38 mg of iron) by mouth once daily with breakfast.       fluticasone 50 mcg/actuation nasal spray  Commonly known as: Flonase                     Where to Get Your Medications        These medications were sent to [facility]  [address] [place] [address]      Hours: 8:30 AM to 5 PM Mon-Fri Phone: [phone]   acetaminophen 325 mg tablet   mg tablet           Outpatient Follow-Up  Future Appointments   Date Time Provider Department Center   2/14/2025 10:30 AM MD [id number]   3/18/2025 11:00 AM MD [id number]       I spent 15 minutes in the professional and overall care of this patient    Lucy Tsai, APRN-CNP

## 2025-02-07 ENCOUNTER — APPOINTMENT (OUTPATIENT)
Dept: OBSTETRICS AND GYNECOLOGY | Facility: CLINIC | Age: 25
End: 2025-02-07
Payer: COMMERCIAL

## 2025-02-14 ENCOUNTER — APPOINTMENT (OUTPATIENT)
Dept: OBSTETRICS AND GYNECOLOGY | Facility: CLINIC | Age: 25
End: 2025-02-14
Payer: COMMERCIAL

## 2025-03-18 ENCOUNTER — APPOINTMENT (OUTPATIENT)
Dept: OBSTETRICS AND GYNECOLOGY | Facility: CLINIC | Age: 25
End: 2025-03-18
Payer: COMMERCIAL

## 2025-03-18 VITALS
WEIGHT: 132.1 LBS | BODY MASS INDEX: 26.63 KG/M2 | DIASTOLIC BLOOD PRESSURE: 90 MMHG | SYSTOLIC BLOOD PRESSURE: 133 MMHG | HEIGHT: 59 IN

## 2025-03-18 PROBLEM — Z3A.28 28 WEEKS GESTATION OF PREGNANCY (HHS-HCC): Status: RESOLVED | Noted: 2024-10-10 | Resolved: 2025-03-18

## 2025-03-18 PROBLEM — D50.9 IRON DEFICIENCY ANEMIA OF PREGNANCY (HHS-HCC): Status: RESOLVED | Noted: 2024-11-21 | Resolved: 2025-03-18

## 2025-03-18 PROBLEM — Z3A.39 39 WEEKS GESTATION OF PREGNANCY (HHS-HCC): Status: RESOLVED | Noted: 2025-02-04 | Resolved: 2025-03-18

## 2025-03-18 PROBLEM — O99.019 IRON DEFICIENCY ANEMIA OF PREGNANCY (HHS-HCC): Status: RESOLVED | Noted: 2024-11-21 | Resolved: 2025-03-18

## 2025-03-18 ASSESSMENT — EDINBURGH POSTNATAL DEPRESSION SCALE (EPDS)
I HAVE BEEN SO UNHAPPY THAT I HAVE BEEN CRYING: NO, NEVER
I HAVE BLAMED MYSELF UNNECESSARILY WHEN THINGS WENT WRONG: NO, NEVER
I HAVE BEEN ANXIOUS OR WORRIED FOR NO GOOD REASON: NO, NOT AT ALL
I HAVE BEEN ABLE TO LAUGH AND SEE THE FUNNY SIDE OF THINGS: AS MUCH AS I ALWAYS COULD
I HAVE FELT SAD OR MISERABLE: NO, NOT AT ALL
THINGS HAVE BEEN GETTING ON TOP OF ME: NO, MOST OF THE TIME I HAVE COPED QUITE WELL
TOTAL SCORE: 1
I HAVE FELT SCARED OR PANICKY FOR NO GOOD REASON: NO, NOT AT ALL
I HAVE BEEN SO UNHAPPY THAT I HAVE HAD DIFFICULTY SLEEPING: NOT AT ALL
THE THOUGHT OF HARMING MYSELF HAS OCCURRED TO ME: NEVER
I HAVE LOOKED FORWARD WITH ENJOYMENT TO THINGS: AS MUCH AS I EVER DID

## 2025-03-18 NOTE — PROGRESS NOTES
Subjective   Patient ID: Sarah Reynolds is a 24 y.o. female who presents for Postpartum Care (Patient here for post partum visit-- 25--pt is bottle feeding/Pt has no c/o and received nexplanon at discharge/Declined chaperone).  HPI  Patient presents for postpartum visit.    Patient is status post spontaneous vaginal delivery on 2025.  Delivered a viable female 6 pounds 7 ounces with no complications.  Currently bottlefeeding.  Had Nexplanon placed after delivery currently with some light irregular bleeding denies any pelvic pain.  Denies postpartum depression.  Last normal Pap smear 2024.  Review of Systems    Objective   Physical Exam  Gen.: Alert and in no acute distress. Well-developed, well-nourished.  Thyroid: Nonenlarged and no palpable thyroid nodules  Cardiovascular: Heart regular rate and rhythm  Pulmonary: Clear bilateral breath sounds  Breasts: Deferred   abdomen: Soft and nontender, no abdominal mass palpated, no organomegaly   Pelvic: External genitalia normal, Bartholin's urethral and [place]'s glands normal. Vagina normal. Cervix normal. Uterus normal size and shape. Right adnexa normal without masses. Left adnexa normal without masses. Perianal area and normal.  Assessment/Plan   Normal postpartum visit, patient may resume regular activities, follow-up in 1 year.         Javy Sharma MD 25 11:25 AM

## 2025-07-15 ENCOUNTER — APPOINTMENT (OUTPATIENT)
Dept: OBSTETRICS AND GYNECOLOGY | Facility: CLINIC | Age: 25
End: 2025-07-15
Payer: COMMERCIAL

## 2025-07-15 VITALS
WEIGHT: 133.9 LBS | SYSTOLIC BLOOD PRESSURE: 117 MMHG | BODY MASS INDEX: 27 KG/M2 | HEIGHT: 59 IN | DIASTOLIC BLOOD PRESSURE: 77 MMHG

## 2025-07-15 DIAGNOSIS — Z30.46 NEXPLANON REMOVAL: Primary | ICD-10-CM

## 2025-07-15 PROCEDURE — 11982 REMOVE DRUG IMPLANT DEVICE: CPT | Performed by: OBSTETRICS & GYNECOLOGY
